# Patient Record
Sex: FEMALE | Race: WHITE | NOT HISPANIC OR LATINO | Employment: FULL TIME | URBAN - METROPOLITAN AREA
[De-identification: names, ages, dates, MRNs, and addresses within clinical notes are randomized per-mention and may not be internally consistent; named-entity substitution may affect disease eponyms.]

---

## 2023-06-06 ENCOUNTER — HOSPITAL ENCOUNTER (OUTPATIENT)
Facility: MEDICAL CENTER | Age: 23
End: 2023-06-06
Attending: OBSTETRICS & GYNECOLOGY
Payer: COMMERCIAL

## 2023-06-06 ENCOUNTER — GYNECOLOGY VISIT (OUTPATIENT)
Dept: OBGYN | Facility: CLINIC | Age: 23
End: 2023-06-06
Payer: COMMERCIAL

## 2023-06-06 VITALS
DIASTOLIC BLOOD PRESSURE: 84 MMHG | SYSTOLIC BLOOD PRESSURE: 113 MMHG | HEIGHT: 62 IN | WEIGHT: 141 LBS | BODY MASS INDEX: 25.95 KG/M2

## 2023-06-06 DIAGNOSIS — Z11.3 SCREEN FOR STD (SEXUALLY TRANSMITTED DISEASE): ICD-10-CM

## 2023-06-06 DIAGNOSIS — Z01.419 WOMEN'S ANNUAL ROUTINE GYNECOLOGICAL EXAMINATION: Primary | ICD-10-CM

## 2023-06-06 DIAGNOSIS — Z12.4 SCREENING FOR MALIGNANT NEOPLASM OF CERVIX: ICD-10-CM

## 2023-06-06 PROCEDURE — 87491 CHLMYD TRACH DNA AMP PROBE: CPT

## 2023-06-06 PROCEDURE — 99385 PREV VISIT NEW AGE 18-39: CPT | Performed by: OBSTETRICS & GYNECOLOGY

## 2023-06-06 PROCEDURE — 88175 CYTOPATH C/V AUTO FLUID REDO: CPT

## 2023-06-06 PROCEDURE — 3079F DIAST BP 80-89 MM HG: CPT | Performed by: OBSTETRICS & GYNECOLOGY

## 2023-06-06 PROCEDURE — 87591 N.GONORRHOEAE DNA AMP PROB: CPT

## 2023-06-06 PROCEDURE — 3074F SYST BP LT 130 MM HG: CPT | Performed by: OBSTETRICS & GYNECOLOGY

## 2023-06-06 NOTE — PROGRESS NOTES
ANNUAL GYNECOLOGY VISIT    Chief Complaint  Gynecologic Exam      Subjective  Génesis Ni is a 23 y.o. female who presents today for Annual Exam. She has no complaints but reports she has never had a pap smear. She has a Corin for contraception and it is due to be exchanged in July.    Preventive Care     There is no immunization history on file for this patient.  HPV vaccine: did not have    Gynecology History and ROS  Current Sexual Activity: yes   #lifetime partners: 1  History of sexually transmitted diseases?  no  Abnormal discharge? no    Menstrual History  No LMP recorded. Patient has had an implant.  Periods are absent secondary to Corin .   Clots or heavy flow: No  Intermenstrual bleeding/spotting: No  Significant pain with periods: No  Bothersome PMS symptoms: No  Significant Pelvic Pain: No    Contraception  IUD inserted ; Corin 2020    Pap History  Last pap: never    Cancer Risk Assessement:  Family history of:   - Breast cancer: paternal grandmother, 80s   - Ovarian cancer: no   - Uterine cancer: no   - Colon cancer: no    Obstetric History  OB History    Para Term  AB Living   0 0 0 0 0 0   SAB IAB Ectopic Molar Multiple Live Births   0 0 0 0 0 0       Past Medical History  Past Medical History:   Diagnosis Date    Asthma     Migraine        Past Surgical History  History reviewed. No pertinent surgical history.    Social History  Social History     Socioeconomic History    Marital status:      Spouse name: Not on file    Number of children: Not on file    Years of education: Not on file    Highest education level: Not on file   Occupational History    Not on file   Tobacco Use    Smoking status: Never    Smokeless tobacco: Never   Vaping Use    Vaping Use: Never used   Substance and Sexual Activity    Alcohol use: Not Currently    Drug use: Never    Sexual activity: Yes     Partners: Male     Birth control/protection: I.U.D.   Other Topics Concern    Not on file   Social  "History Narrative    Not on file     Social Determinants of Health     Financial Resource Strain: Not on file   Food Insecurity: Not on file   Transportation Needs: Not on file   Physical Activity: Not on file   Stress: Not on file   Social Connections: Not on file   Intimate Partner Violence: Not on file   Housing Stability: Not on file       Family History  Family History   Problem Relation Age of Onset    No Known Problems Mother     No Known Problems Father     Diabetes Maternal Grandmother     Breast Cancer Paternal Grandmother        Home Medications  No current outpatient medications on file prior to visit.     No current facility-administered medications on file prior to visit.       Allergies/Reactions  No Known Allergies    ROS  Positive ROS: none  Gen: no fevers or chills, no significant weight loss or gain, excessive fatigue  Respiratory:  no cough or dyspnea  Cardiac:  no chest pain, no palpitations, no syncope  Breast: no breast discharge, pain, lump or skin changes  GI:  no heartburn, no abdominal pain, no nausea or vomiting  Urinary: no dysuria, urgency, frequency, incontinence   Psych: no depression or anxiety  Neuro: no migraines with aura, fainting spells, numbness or tingling  Extremities: no joint pain, persistently swollen ankles, recurrent leg cramps      Physical Examination:  Vital Signs:   Vitals:    06/06/23 0813   BP: 113/84   Weight: 141 lb   Height: 5' 2\"     Body mass index is 25.79 kg/m².    Constitutional: The patient is well developed and well nourished.  Psychiatric: Patient is oriented to time place and person.   Skin: No rash observed.  Neck: Appears symmetric. Thyroid normal size  Respiratory: normal effort  Breast: Inspection reveals no asymetry or nipple discharge, no skin thickening, dimpling or erythema.  Palpation demonstrates no masses.  Abdomen: Soft, non-tender.  Pelvic Exam:     Vulva: external female genitalia are normal in appearance. No lesions    Urethra - no " lesions, no erythema    Vagina: moist, pink, normal ruggae    Cervix: pink, smooth, no lesions, no CMT; IUD string visible    Uterus - non-tender, normal size, shape, contour, mobile, anteverted    Ovaries: non-tender, no appreciable masses  Pap Smear and/or performed: yes  Extremeties: Legs are symmetric and without tenderness. There is no edema present.      Assessment & Plan  Génesis Ni is a 23 y.o. female who presents today for Annual Gyn Exam.     1. Women's annual routine gynecological examination  Breast and pelvic exam completed  Pap: done  Advised on breast self awareness  Advised on flu and/or covid vaccines in season  Advised on calcium and vit D intakr    2. Screening for malignant neoplasm of cervix    - THINPREP RFLX HPV ASCUS W/CTNG; Future    3. Screen for STD (sexually transmitted disease)    - THINPREP RFLX HPV ASCUS W/CTNG; Future      Return: for IUD remove and replacement and annually    Arlen Chapman D.O.

## 2023-06-07 LAB
C TRACH DNA GENITAL QL NAA+PROBE: NEGATIVE
CYTOLOGY REG CYTOL: NORMAL
N GONORRHOEA DNA GENITAL QL NAA+PROBE: NEGATIVE
SPECIMEN SOURCE: NORMAL

## 2023-07-25 ENCOUNTER — APPOINTMENT (OUTPATIENT)
Dept: OBGYN | Facility: CLINIC | Age: 23
End: 2023-07-25
Payer: COMMERCIAL

## 2023-07-28 ENCOUNTER — GYNECOLOGY VISIT (OUTPATIENT)
Dept: OBGYN | Facility: CLINIC | Age: 23
End: 2023-07-28
Payer: COMMERCIAL

## 2023-07-28 VITALS — BODY MASS INDEX: 25.97 KG/M2 | WEIGHT: 142 LBS | DIASTOLIC BLOOD PRESSURE: 86 MMHG | SYSTOLIC BLOOD PRESSURE: 119 MMHG

## 2023-07-28 DIAGNOSIS — Z30.433 ENCOUNTER FOR IUD REMOVAL AND REINSERTION: Primary | ICD-10-CM

## 2023-07-28 PROCEDURE — 58301 REMOVE INTRAUTERINE DEVICE: CPT | Performed by: OBSTETRICS & GYNECOLOGY

## 2023-07-28 PROCEDURE — 3074F SYST BP LT 130 MM HG: CPT | Performed by: OBSTETRICS & GYNECOLOGY

## 2023-07-28 PROCEDURE — 3079F DIAST BP 80-89 MM HG: CPT | Performed by: OBSTETRICS & GYNECOLOGY

## 2023-07-28 PROCEDURE — 58300 INSERT INTRAUTERINE DEVICE: CPT | Performed by: OBSTETRICS & GYNECOLOGY

## 2023-07-28 NOTE — PROGRESS NOTES
IUD Removal and Reinsertion Procedure Note    Reason for removal:  Sklya  The speculum was placed.  Betadine was applied to the cervix  The IUD strings were visible.  The strings were grasped and gentle traction was used to remove the device.  The procedure was completed successfully.  Type of IUD removed: Corin    Tenaculum clamp was then used used to grasp the cervix and provide counter traction.  The uterus was sounded with the IUD device to 6cm.   The device was withdrawn 1cm and the IUD was then deployed and gently advanced to the fundus without complications then the device removed.  Type of IUD placed: Mirena     Aftercare discussed. She is to return for fever, worsening pelvic pain, abdominal pain, syncope, unusually heavy vaginal bleeding, suspected expulsion, foul smelling vaginal discharge, or pregnancy-like symptoms.     If the patient is comfortable she may also perform a digital self examination to check for the strings or return to office in 1 mo for any concerns with IUD symptoms/placement/possible expulsion, otherwise no follow up needed.      Arlen Chapman D.O.  2023

## 2023-12-15 ENCOUNTER — OFFICE VISIT (OUTPATIENT)
Dept: URGENT CARE | Facility: PHYSICIAN GROUP | Age: 23
End: 2023-12-15
Payer: COMMERCIAL

## 2023-12-15 VITALS
DIASTOLIC BLOOD PRESSURE: 62 MMHG | TEMPERATURE: 98.6 F | SYSTOLIC BLOOD PRESSURE: 100 MMHG | OXYGEN SATURATION: 98 % | WEIGHT: 151 LBS | HEIGHT: 61 IN | RESPIRATION RATE: 16 BRPM | BODY MASS INDEX: 28.51 KG/M2 | HEART RATE: 88 BPM

## 2023-12-15 DIAGNOSIS — T78.40XA ALLERGIC REACTION, INITIAL ENCOUNTER: ICD-10-CM

## 2023-12-15 PROCEDURE — 3078F DIAST BP <80 MM HG: CPT | Performed by: NURSE PRACTITIONER

## 2023-12-15 PROCEDURE — 3074F SYST BP LT 130 MM HG: CPT | Performed by: NURSE PRACTITIONER

## 2023-12-15 PROCEDURE — 99203 OFFICE O/P NEW LOW 30 MIN: CPT | Performed by: NURSE PRACTITIONER

## 2023-12-15 RX ORDER — PREDNISONE 20 MG/1
20 TABLET ORAL DAILY
Qty: 7 TABLET | Refills: 0 | Status: SHIPPED | OUTPATIENT
Start: 2023-12-15 | End: 2023-12-22

## 2023-12-15 ASSESSMENT — ENCOUNTER SYMPTOMS
CHILLS: 0
SORE THROAT: 0
SHORTNESS OF BREATH: 0
FEVER: 0

## 2023-12-15 NOTE — PROGRESS NOTES
"Subjective:     Génesis Ni is a 23 y.o. female who presents for Allergic Reaction (X 3 days on face, neck and now all over body with mild chest tightness.)      Allergic Reaction  Associated symptoms include a rash. Pertinent negatives include no chills, fever or sore throat.     Pt presents for evaluation of a new problem.  Sascha is a very pleasant 23-year-old female presents to urgent care today with a suspected allergic reaction that started on her face 2 days ago.  Her rash has now spread to her neck and upper arms.  She denies any known mechanism including changes in soap, lotion, detergent, medication or food.  She does note that her rash both itches and hurts.  She has tried using over-the-counter hydrocortisone cream with no relief.      Review of Systems   Constitutional:  Negative for chills and fever.   HENT:  Negative for sore throat.    Respiratory:  Negative for shortness of breath.    Skin:  Positive for itching and rash.       PMH:   Past Medical History:   Diagnosis Date    Asthma     Migraine      ALLERGIES: No Known Allergies  SURGHX: No past surgical history on file.  SOCHX:   Social History     Socioeconomic History    Marital status:    Tobacco Use    Smoking status: Never    Smokeless tobacco: Never   Vaping Use    Vaping Use: Never used   Substance and Sexual Activity    Alcohol use: Not Currently    Drug use: Never    Sexual activity: Yes     Partners: Male     Birth control/protection: I.U.D.     FH:   Family History   Problem Relation Age of Onset    No Known Problems Mother     No Known Problems Father     Diabetes Maternal Grandmother     Breast Cancer Paternal Grandmother          Objective:   /62   Pulse 88   Temp 37 °C (98.6 °F) (Temporal)   Resp 16   Ht 1.549 m (5' 1\")   Wt 68.5 kg (151 lb)   SpO2 98%   BMI 28.53 kg/m²     Physical Exam  Vitals and nursing note reviewed.   Constitutional:       General: She is not in acute distress.     Appearance: Normal " appearance. She is normal weight. She is not ill-appearing or toxic-appearing.   HENT:      Head: Normocephalic.      Right Ear: External ear normal.      Left Ear: External ear normal.      Nose: No congestion or rhinorrhea.      Mouth/Throat:      Pharynx: No oropharyngeal exudate or posterior oropharyngeal erythema.   Eyes:      General:         Right eye: No discharge.         Left eye: No discharge.      Pupils: Pupils are equal, round, and reactive to light.   Pulmonary:      Effort: Pulmonary effort is normal.   Abdominal:      General: Abdomen is flat.   Musculoskeletal:         General: Normal range of motion.      Cervical back: Normal range of motion and neck supple.   Skin:     General: Skin is dry.      Comments: Macular erythemic rash present to bilateral face, neck and bilateral upper extremities.   Neurological:      General: No focal deficit present.      Mental Status: She is alert and oriented to person, place, and time. Mental status is at baseline.   Psychiatric:         Mood and Affect: Mood normal.         Behavior: Behavior normal.         Thought Content: Thought content normal.         Judgment: Judgment normal.         Assessment/Plan:   Assessment    1. Allergic reaction, initial encounter  predniSONE (DELTASONE) 20 MG Tab        Differential diagnoses discussed with patient.  She was prescribed 20 mg prednisone x 7 days for relief of her rash.  We did discuss use of oral antihistamine for further relief of itching.  Cool compresses recommended.  Patient to notify me if rash remains persistent and I will refer her to follow-up with allergy.  She is in agreement with plan of care.

## 2024-05-07 ENCOUNTER — OFFICE VISIT (OUTPATIENT)
Dept: URGENT CARE | Facility: PHYSICIAN GROUP | Age: 24
End: 2024-05-07
Payer: COMMERCIAL

## 2024-05-07 VITALS
HEART RATE: 74 BPM | RESPIRATION RATE: 14 BRPM | SYSTOLIC BLOOD PRESSURE: 124 MMHG | OXYGEN SATURATION: 97 % | HEIGHT: 62 IN | WEIGHT: 144 LBS | BODY MASS INDEX: 26.5 KG/M2 | DIASTOLIC BLOOD PRESSURE: 70 MMHG | TEMPERATURE: 98.8 F

## 2024-05-07 DIAGNOSIS — H10.33 ACUTE BACTERIAL CONJUNCTIVITIS OF BOTH EYES: ICD-10-CM

## 2024-05-07 DIAGNOSIS — J02.9 PHARYNGITIS, UNSPECIFIED ETIOLOGY: ICD-10-CM

## 2024-05-07 LAB — S PYO DNA SPEC NAA+PROBE: NOT DETECTED

## 2024-05-07 PROCEDURE — 3078F DIAST BP <80 MM HG: CPT | Performed by: NURSE PRACTITIONER

## 2024-05-07 PROCEDURE — 3074F SYST BP LT 130 MM HG: CPT | Performed by: NURSE PRACTITIONER

## 2024-05-07 PROCEDURE — 99213 OFFICE O/P EST LOW 20 MIN: CPT | Performed by: NURSE PRACTITIONER

## 2024-05-07 PROCEDURE — 87651 STREP A DNA AMP PROBE: CPT | Performed by: NURSE PRACTITIONER

## 2024-05-07 RX ORDER — POLYMYXIN B SULFATE AND TRIMETHOPRIM 1; 10000 MG/ML; [USP'U]/ML
1 SOLUTION OPHTHALMIC EVERY 4 HOURS
Qty: 10 ML | Refills: 0 | Status: SHIPPED | OUTPATIENT
Start: 2024-05-07 | End: 2024-05-14

## 2024-05-07 ASSESSMENT — ENCOUNTER SYMPTOMS
EYE DISCHARGE: 1
PHOTOPHOBIA: 0
RESPIRATORY NEGATIVE: 1
BLURRED VISION: 0
DOUBLE VISION: 0
FEVER: 0
SHORTNESS OF BREATH: 0
EYE REDNESS: 1
SORE THROAT: 1
CHILLS: 0
TROUBLE SWALLOWING: 1
COUGH: 0
CONSTITUTIONAL NEGATIVE: 1
MYALGIAS: 1

## 2024-05-07 ASSESSMENT — VISUAL ACUITY: OU: 1

## 2024-05-07 NOTE — PROGRESS NOTES
Subjective:     Génesis Ni is a 23 y.o. female who presents for Eye Problem (Bilateral draining and sore x 2-3 days ) and Pharyngitis (X 1 wk)       Eye Problem   This is a new problem. Episode onset: Started right eye yesterday, left eye today. The problem has been gradually worsening. There was no injury mechanism. Associated symptoms include an eye discharge and eye redness. Pertinent negatives include no blurred vision, double vision, fever or photophobia.   Pharyngitis   This is a new problem. Episode onset: 1 week ago. The problem has been gradually worsening. Associated symptoms include ear pain (Resolved) and trouble swallowing (Painful). Pertinent negatives include no congestion, coughing or shortness of breath. She has tried acetaminophen for the symptoms.     Review of Systems   Constitutional: Negative.  Negative for chills, fever and malaise/fatigue.   HENT:  Positive for ear pain (Resolved), sore throat and trouble swallowing (Painful). Negative for congestion.    Eyes:  Positive for discharge and redness. Negative for blurred vision, double vision and photophobia.   Respiratory: Negative.  Negative for cough and shortness of breath.    Musculoskeletal:  Positive for myalgias.   All other systems reviewed and are negative.    Refer to HPI for additional details.    During this visit, appropriate PPE was worn, and hand hygiene was performed.    PMH:  has a past medical history of Asthma and Migraine.    MEDS:   Current Outpatient Medications:     polymixin-trimethoprim (POLYTRIM) 99749-0.1 UNIT/ML-% Solution, Administer 1 Drop into both eyes every 4 hours for 7 days., Disp: 10 mL, Rfl: 0    ALLERGIES: No Known Allergies  SURGHX: History reviewed. No pertinent surgical history.  SOCHX:  reports that she has never smoked. She has never used smokeless tobacco. She reports that she does not currently use alcohol. She reports that she does not use drugs.    FH: Per HPI as  "applicable/pertinent.      Objective:     /70   Pulse 74   Temp 37.1 °C (98.8 °F) (Temporal)   Resp 14   Ht 1.575 m (5' 2\")   Wt 65.3 kg (144 lb)   SpO2 97%   BMI 26.34 kg/m²     Physical Exam  Nursing note reviewed.   Constitutional:       General: She is not in acute distress.     Appearance: She is well-developed. She is not ill-appearing or toxic-appearing.   HENT:      Head: No right periorbital erythema or left periorbital erythema.      Right Ear: Tympanic membrane normal.      Left Ear: Tympanic membrane normal.      Nose: Nose normal.      Right Sinus: No maxillary sinus tenderness or frontal sinus tenderness.      Left Sinus: No maxillary sinus tenderness or frontal sinus tenderness.      Mouth/Throat:      Mouth: Mucous membranes are moist.      Pharynx: Uvula midline. Pharyngeal swelling and posterior oropharyngeal erythema present. No oropharyngeal exudate.   Eyes:      General: Lids are normal. Vision grossly intact.         Right eye: No discharge.         Left eye: No discharge.      Extraocular Movements: Extraocular movements intact.      Conjunctiva/sclera:      Right eye: Right conjunctiva is injected.      Left eye: Left conjunctiva is injected.      Pupils: Pupils are equal, round, and reactive to light.   Neck:      Trachea: Phonation normal.   Cardiovascular:      Rate and Rhythm: Normal rate.   Pulmonary:      Effort: Pulmonary effort is normal. No respiratory distress.   Musculoskeletal:         General: No deformity. Normal range of motion.      Cervical back: Neck supple.   Skin:     General: Skin is warm and dry.      Coloration: Skin is not pale.   Neurological:      Mental Status: She is alert and oriented to person, place, and time.      Motor: No weakness.   Psychiatric:         Behavior: Behavior normal. Behavior is cooperative.     POCT Cepheid Group A Strep by PCR: negative      Assessment/Plan:     1. Acute bacterial conjunctivitis of both eyes  - " polymixin-trimethoprim (POLYTRIM) 99981-6.1 UNIT/ML-% Solution; Administer 1 Drop into both eyes every 4 hours for 7 days.  Dispense: 10 mL; Refill: 0    2. Pharyngitis, unspecified etiology  - POCT CEPHEID GROUP A STREP - PCR    Rx as above sent electronically.    Discussed likely self-limiting viral etiology for pharyngitis and expected course and duration of illness.     Emphasize supportive measures and symptom management with over-the-counter medication as needed.    Monitor. Return precautions advised.     Differential diagnosis, natural history, supportive care, over-the-counter symptom management per 's instructions, close monitoring, and indications for immediate follow-up discussed.     All questions answered. Patient agrees with the plan of care.    Discharge summary provided via [employer].

## 2024-05-07 NOTE — PATIENT INSTRUCTIONS
Advised of infectious nature of conjunctivitis. Isolate until on antibiotic drops for at least 24 hours. Avoid rubbing eyes. Perform frequent hand hygiene.

## 2024-09-26 ENCOUNTER — GYNECOLOGY VISIT (OUTPATIENT)
Dept: OBGYN | Facility: CLINIC | Age: 24
End: 2024-09-26
Payer: COMMERCIAL

## 2024-09-26 VITALS — SYSTOLIC BLOOD PRESSURE: 132 MMHG | BODY MASS INDEX: 26.16 KG/M2 | WEIGHT: 143 LBS | DIASTOLIC BLOOD PRESSURE: 76 MMHG

## 2024-09-26 DIAGNOSIS — Z30.432 ENCOUNTER FOR IUD REMOVAL: ICD-10-CM

## 2024-09-26 DIAGNOSIS — Z78.9 ATTEMPTING TO CONCEIVE: ICD-10-CM

## 2024-09-26 NOTE — PROGRESS NOTES
GYN FOLLOW UP VISIT    CC:  Procedure (IUD REMOVAL/)       HPI: Patient is a 24 y.o.  who presents for IUD removal. She had her IUD replaced 2023 with Dr. Chapman. She is getting this removed because her and her partner are trying to conceive.        ROS:   General: denies fever / chills  HEENT: denies sore throat:  CV: denies chest pain:  Repiratory: denies shortness of breath  GI: denies abdominal pain  : denies dysuria:    PFSH:  I personally reviewed the past medical and surgical histories.       PHYSICAL EXAMINATION:  Vital Signs:   Vitals:    24 1425   BP: 132/76   Weight: 143 lb     Body mass index is 26.16 kg/m².    Gen: appears well, NAD  Respiratory: normal effort  Abdomen: Soft, non-tender.  Pelvic Exam:    Vulva: normal.    Urethra: normal.   Vagina: normal.    Cervix: normal. IUD strings visualized at cervical OS   Uterus: normal size, shape and contour.    left adnexa: normal.   right adnexa: normal.   Perineum: normal.    ASSESSMENT AND PLAN:  24 y.o.        1. Encounter for IUD removal  See procedure note for IUD removal.   - Consent for all Surgical, Special Diagnostic or Therapeutic Procedures    2. Attempting to conceive  -Recommended that she start taking a prenatal vitamin and 400 micrograms of folic acid daily  - Discussed the importance of eating a very healthy diet to include fresh fruits and veggies, lean meats, whole grains, and drink plenty of water. Recommend regular exercise.   - Reviewed techniques to aid conception. Discussed methods and frequency for trying and ovulation testing. Read the directions on the box as they are all different.  - Avoid alcohol, tobacco, excessive caffeine, and drugs of any kind, unless needed and prescribed. Stay away from tanning beds, hot tubs and saunas.   - Avoid harmful chemicals, environmental contaminants, and other toxic substances such as synthetic chemicals, some metals, fertilizer, bug spray, and cat or rodent feces around  the home and in the workplace.         Time spent: 30 minutes      Ashely Moreland P.A.-C.

## 2024-09-26 NOTE — PROCEDURES
IUD Removal Procedure Note    Reason for removal: trying to conceive  The speculum was placed.  The IUD strings were visible.  The strings were grasped and gentle traction was used to remove the device.  The procedure was completed successfully.  Type of IUD removed: Corin Moreland P.A.-C.  9/26/2024

## 2024-12-12 ENCOUNTER — TELEPHONE (OUTPATIENT)
Dept: OBGYN | Facility: CLINIC | Age: 24
End: 2024-12-12
Payer: COMMERCIAL

## 2024-12-12 NOTE — TELEPHONE ENCOUNTER
Left message informing patient that appt had to be moved in time. The provider will be in a meeting at the time she was scheduled. MD@2:17PM   Medication/Dose: percocet  Patient last seen by PCP: 2/26/2021  Next office visit with PCP: none  Last Lab:9/13/19    Above information requires contacting patient: No    PDMP needs to be reviewed by Provider    Sent to provider to approve or deny

## 2025-01-15 ENCOUNTER — HOSPITAL ENCOUNTER (OUTPATIENT)
Facility: MEDICAL CENTER | Age: 25
End: 2025-01-15
Attending: STUDENT IN AN ORGANIZED HEALTH CARE EDUCATION/TRAINING PROGRAM
Payer: COMMERCIAL

## 2025-01-15 ENCOUNTER — APPOINTMENT (OUTPATIENT)
Dept: OBGYN | Facility: CLINIC | Age: 25
End: 2025-01-15
Payer: COMMERCIAL

## 2025-01-15 VITALS — BODY MASS INDEX: 24.69 KG/M2 | DIASTOLIC BLOOD PRESSURE: 72 MMHG | WEIGHT: 135 LBS | SYSTOLIC BLOOD PRESSURE: 121 MMHG

## 2025-01-15 DIAGNOSIS — Z32.01 PREGNANCY TEST POSITIVE: ICD-10-CM

## 2025-01-15 DIAGNOSIS — Z34.81 ENCOUNTER FOR SUPERVISION OF OTHER NORMAL PREGNANCY, FIRST TRIMESTER: ICD-10-CM

## 2025-01-15 DIAGNOSIS — Z86.59 HISTORY OF DEPRESSION: ICD-10-CM

## 2025-01-15 DIAGNOSIS — Z34.01 ENCOUNTER FOR SUPERVISION OF NORMAL FIRST PREGNANCY IN FIRST TRIMESTER: ICD-10-CM

## 2025-01-15 LAB
POCT INT CON NEG: NEGATIVE
POCT INT CON POS: POSITIVE
POCT URINE PREGNANCY TEST: POSITIVE

## 2025-01-15 PROCEDURE — 0501F PRENATAL FLOW SHEET: CPT | Performed by: STUDENT IN AN ORGANIZED HEALTH CARE EDUCATION/TRAINING PROGRAM

## 2025-01-15 PROCEDURE — 3074F SYST BP LT 130 MM HG: CPT | Performed by: STUDENT IN AN ORGANIZED HEALTH CARE EDUCATION/TRAINING PROGRAM

## 2025-01-15 PROCEDURE — 3078F DIAST BP <80 MM HG: CPT | Performed by: STUDENT IN AN ORGANIZED HEALTH CARE EDUCATION/TRAINING PROGRAM

## 2025-01-15 PROCEDURE — 87591 N.GONORRHOEAE DNA AMP PROB: CPT

## 2025-01-15 PROCEDURE — 81025 URINE PREGNANCY TEST: CPT | Performed by: STUDENT IN AN ORGANIZED HEALTH CARE EDUCATION/TRAINING PROGRAM

## 2025-01-15 PROCEDURE — 87491 CHLMYD TRACH DNA AMP PROBE: CPT

## 2025-01-15 ASSESSMENT — EDINBURGH POSTNATAL DEPRESSION SCALE (EPDS)
I HAVE FELT SCARED OR PANICKY FOR NO GOOD REASON: NO, NOT MUCH
I HAVE BEEN ABLE TO LAUGH AND SEE THE FUNNY SIDE OF THINGS: AS MUCH AS I ALWAYS COULD
THINGS HAVE BEEN GETTING ON TOP OF ME: YES, SOMETIMES I HAVEN'T BEEN COPING AS WELL AS USUAL
THE THOUGHT OF HARMING MYSELF HAS OCCURRED TO ME: HARDLY EVER
I HAVE BEEN ANXIOUS OR WORRIED FOR NO GOOD REASON: YES, SOMETIMES
TOTAL SCORE: 10
I HAVE FELT SAD OR MISERABLE: NOT VERY OFTEN
I HAVE BEEN SO UNHAPPY THAT I HAVE BEEN CRYING: ONLY OCCASIONALLY
I HAVE BLAMED MYSELF UNNECESSARILY WHEN THINGS WENT WRONG: NOT VERY OFTEN
I HAVE BEEN SO UNHAPPY THAT I HAVE HAD DIFFICULTY SLEEPING: NOT VERY OFTEN
I HAVE LOOKED FORWARD WITH ENJOYMENT TO THINGS: AS MUCH AS I EVER DID

## 2025-01-15 NOTE — ASSESSMENT & PLAN NOTE
Hx of depression between [id number] and was on wellbutrin. Currently is not on anything and does not see a therapist routinely.   EPDS: 10  Denies SI or HI. Currently not interested in medication. Resources for local therapists given to patient.

## 2025-01-15 NOTE — ASSESSMENT & PLAN NOTE
24 y.o.  dated by LMP  - Final LORETTA: 2025    Screening:  [ ] ASA indicated (< 16 wks): pk yes no ASA: No  [ ] Early DM screening indicated? - No  [ ]  depression (EPDS score(s)): 10  [ ] H/o genital HSV? no  [ ] Varicella immunity: denies hx of chicken pox    Labs and routine testing  [ ] 1T labs: **  [ ] ABO/RH: **, Ab screen neg**  [ ] Pap: 2023- NILM  [ ] Genetic screening: Panorama NIPT  [ ] AFP (15-20 wks): **  [ ] GDM (24-28 wks) screening   [ ] 1 hr GTT: **    [ ] 3hr GTT: **  [ ] 3T labs (28+ wks): Hgb: **/ plts **/HIV **/syphilis **  [ ] GBS (35 wks) **      Immunizations:   [ ] tdap (27+ wks) -   [ ] flu-    [ ] RSV (32-36 wks)-   [ ] Rhogam -     Ultrasound(s):   [ ] Anatomy US: **    Delivery Plan:   [ ] Planned mode of delivery:   [ ] BTL consent:      [ ] Fetal presentation (35-36 w):   [ ] Timing of delivery:   [ ] Accepting of transfusion: Yes    Postpartum:  [ ] PPBCM: **     [ ] considering BTL? (Med 178 signed): **  [ ] PP Vaccinations needed: **  [ ] Breat Pump       Orders:    CBC WITHOUT DIFFERENTIAL; Future    Chlamydia/GC, PCR (Urine); Future    HEMOGLOBIN A1C; Future    HEP B CORE AB TOTAL; Future    HEP B SURFACE AB; Future    HEP B SURFACE ANTIGEN; Future    HEP C VIRUS ANTIBODY; Future    HIV AG/AB COMBO ASSAY SCREENING; Future    OP Prenatal Panel-Blood Bank; Future    RUBELLA ABS IGG; Future    T.PALLIDUM AB LEFTY (SCREENING); Future    URINE CULTURE(NEW); Future    URINE DRUG SCREEN; Future    VARICELLA ZOSTER IGG AB; Future    US-OB 1ST TRIMESTER WITH TRANSVAGINAL (COMBO); Future

## 2025-01-15 NOTE — PROGRESS NOTES
Establish Pregnancy Visit    CC: First OB Visit    HPI: Patient is a 24 y.o.  at 10w4d who presents for her first OB visit.    Patient's last menstrual period was 2024. giving her an Estimated Date of Delivery: 25.  Had IUD and was removed in September. Had two menstrual cycles since having IUD removed.  She is NOT feeling fetal movement.  She denies vaginal bleeding, reports nausea, denies vomiting. She is currently not taking anything for nausea. She denies headaches, vaginal discharge, or urinary symptoms.      Pt reports insomnia. Has been taking Unisom 25 mg nightly. Also, c/o heartbun. States is improving. Has not tried anything for this.  ER visits or previous care in this pregnancy: DENIES.     FOB is PATRICIA   Pregnancy is planned, desired.    She is currently working [employer].    Pre-eclampsia Risk Factors:   None     Gestational Diabetes Risk Factors:   None      GYN HX:   Last Pap: 2023 NILM  Hx Moderate or Severe Dysplasia : no  Hx STD : no    OBSTETRIC HISTORY:  OB History    Para Term  AB Living   1 0 0 0 0 0   SAB IAB Ectopic Molar Multiple Live Births   0 0 0 0 0 0      # Outcome Date GA Lbr Addison/2nd Weight Sex Type Anes PTL Lv   1 Current                Prior pregnancy complications: n/a .   Prior caesarian deliveries: n/a.  No history of preeclampsia, gestational diabetes, macrosomia, fetal growth restriction or low birthweight baby, or genetic abnormalities.   No history of  deliveries, stillborn, or multiples.     MEDICAL HISTORY:  Past Medical History:   Diagnosis Date    Asthma     Migraine        Significant medical history: Denies .   Denies history of asthma, HSV, PCOS, diabetes or glucose impairment, renal disease, hypertension, hyperlipidemia, autoimmune diseases (lupus or antiphospholipid antibody syndrome), sleep apnea. No history of transfusions.     Psych History   Depression/anxiety:  Hx of depression. Dx'd [id number]. Was on  wellbutrin and felt this helped during her pregnancy.Does not see regular therapist.   Bipolar    Denies  Postpartum Mood Changes N/a    MEDICATIONS:  Current Outpatient Medications on File Prior to Visit   Medication Sig Dispense Refill    Prenatal MV-Min-Fe Fum-FA-DHA (PRENATAL 1 PO) Take  by mouth.      Doxylamine Succinate, Sleep, (UNISOM PO) Take  by mouth.       No current facility-administered medications on file prior to visit.       Current Medications:   Patient is taking prenatal vitamin.  Significant medications: Ricki.     FAMILY HISTORY:  Family History   Problem Relation Age of Onset    No Known Problems Mother     No Known Problems Father     Diabetes Maternal Grandmother     Breast Cancer Paternal Grandmother        Denies Family history of genetic disorders (cystic fibrosis, SMA, Fragile X, Ti-sachs), hemoglobinopathies (thalassemia, Sickle cell), preeclampsia, birth defects, developmental delays, diabetes, cardiovascular disease, cancers.     SURGICAL HISTORY:  History reviewed. No pertinent surgical history.    Denies history of pelvic or bariatric surgery     ALLERGIES / REACTIONS:  No Known Allergies             SOCIAL HISTORY:   reports that she has never smoked. She has never used smokeless tobacco. She reports that she does not currently use alcohol. She reports that she does not use drugs.    Denies any current or hx of sexual, emotional or physical abuse or trauma.     ROS:   Gen: no fevers or chills, no significant weight loss or gain, excessive fatigue  Respiratory:  no cough or dyspnea  Cardiac:  no chest pain, no palpitations, no syncope  Breast: no breast discharge, pain, lump or skin changes  GI:  no heartburn, no abdominal pain, no nausea or vomiting  Urinary: no dysuria, urgency, frequency, incontinence   Psych: no depression or anxiety  Neuro: no migraines with aura, fainting spells, numbness or tingling  Extremities: no joint pain, persistently swollen ankles, recurrent leg  "cramps         PHYSICAL EXAMINATION:  Vital Signs: /72   Wt 135 lb   LMP 11/02/2024 Comment: irregular with iud  BMI 24.69 kg/m²    Constitutional: The patient is well developed and well nourished.  Psychiatric: Patient is oriented to time place and person.   Skin: No rash observed.  Neck: Neck appears symmetric. There are no masses or adenopathy present.  Respiratory: normal effort  Abdomen: Soft, non-tender.  Pelvic: declined   Pap Smear Obtained: yes and no  Extremeties: Legs are symmetric and without tenderness. There is no edema present.    EPDS Score: 10- she answered \"hardly ever\" on number 10 on EPDS    ACOG SCREENING  Infection Prevention  1. High Risk For HIV: No 6. Rash Or Illness Since LMP: No     2. High Risk For Hepatitis B or C: No 7. History Of STD, GC, Chlamydia, HPV Syphilis: No     3. Live With Someone With TB Or Exposed To TB: No 8. Have a cat in the home?: No     4. Patient Or Partner Has A History Of Herpes: No      5. History of Chicken Pox: No 9. Other (See Comments Below): No            Genetic Screening/Teratology Counseling- Includes patient, baby's father, or anyone in either family with:  Patient's age 35 years or older as of estimated date of delivery: No     Thalassemia (Italian, Greek, Mediterranean, or  background): MCV less than 80: No     Neural tube defect (Meningomyelocele, Spina bifida, or Anencephaly): No     Congenital heart defect: No     Down syndrome: No     Ti-Sachs (Ashkenazi Mu-ism, Cajun, Nepalese [place]): No     Canavan disease (Ashkenazi Mu-ism): No     Familial dysautonomia (Ashkenazi Mu-ism): No     Sickle cell disease or trait (): No     Hemophilia or other blood disorders: No     Muscular dystrophy: No    Cystic fibrosis: No     Armida's chorea: No     Mental retardation/autism: No     Other inherited genetic or chromosomal disorder: No     Maternal metabolic disorder (eg. Type 1 diabetes, PKU): Yes (Comment: MOB maternal grandma) "     Patient or baby's father had child with birth defects not listed above: No     Recurrent pregnancy loss, or a stillbirth: No     Medications (including supplements, vitamins, herbs, or OTC drugs)/illicit/recreational drugs/alcohol since last menstrual period: No                 ASSESSMENT AND PLAN:  24 y.o.  at 10w4d     Pregnancy Problems (from 01/15/25 to present)       No problems associated with this episode.          Assessment & Plan  Encounter for supervision of normal first pregnancy in first trimester    24 y.o.  dated by LMP  - Final LORETTA: 2025    Screening:  [ ] ASA indicated (< 16 wks): pk yes no ASA: No  [ ] Early DM screening indicated? - No  [ ]  depression (EPDS score(s)): 10  [ ] H/o genital HSV? no  [ ] Varicella immunity: denies hx of chicken pox    Labs and routine testing  [ ] 1T labs: **  [ ] ABO/RH: **, Ab screen neg**  [ ] Pap: 2023- NILM  [ ] Genetic screening: Panorama NIPT  [ ] AFP (15-20 wks): **  [ ] GDM (24-28 wks) screening   [ ] 1 hr GTT: **    [ ] 3hr GTT: **  [ ] 3T labs (28+ wks): Hgb: **/ plts **/HIV **/syphilis **  [ ] GBS (35 wks) **      Immunizations:   [ ] tdap (27+ wks) -   [ ] flu-    [ ] RSV (32-36 wks)-   [ ] Rhogam -     Ultrasound(s):   [ ] Anatomy US: **    Delivery Plan:   [ ] Planned mode of delivery:   [ ] BTL consent:      [ ] Fetal presentation (35-36 w):   [ ] Timing of delivery:   [ ] Accepting of transfusion: Yes    Postpartum:  [ ] PPBCM: **     [ ] considering BTL? (Med 178 signed): **  [ ] PP Vaccinations needed: **  [ ] Breat Pump       Orders:    CBC WITHOUT DIFFERENTIAL; Future    Chlamydia/GC, PCR (Urine); Future    HEMOGLOBIN A1C; Future    HEP B CORE AB TOTAL; Future    HEP B SURFACE AB; Future    HEP B SURFACE ANTIGEN; Future    HEP C VIRUS ANTIBODY; Future    HIV AG/AB COMBO ASSAY SCREENING; Future    OP Prenatal Panel-Blood Bank; Future    RUBELLA ABS IGG; Future    T.PALLIDUM AB LEFTY (SCREENING); Future    URINE  CULTURE(NEW); Future    URINE DRUG SCREEN; Future    VARICELLA ZOSTER IGG AB; Future    US-OB 1ST TRIMESTER WITH TRANSVAGINAL (COMBO); Future    Encounter for supervision of other normal pregnancy, first trimester    Orders:    MICHAEL PRENATAL TEST    History of depression  Hx of depression between 4773-9479 and was on wellbutrin. Currently is not on anything and does not see a therapist routinely.   EPDS: 10  Denies SI or HI. Currently not interested in medication. Resources for local therapists given to patient.                  - Orders  - Pap UTD, STI testing, Vag path: declined  - Routine Labs ordered:  PNL, Hepatitis Triple screening, UDS  - Discussed options for genetic/aneuploidy testing and information given for pt to consider.  Advised to call insurance for cost of testing.             -  Ordered Aneuploidy testing (NIPT)  - Dating pregnancy US: ordered   - Pregnancy risk factor counseling   - Start ASA after 12 weeks: not indicated   - Early 1 hour GTT ordered: not indicated   - Baseline PIH labs: not indicated   - Hemoglobin electrophoresis ordered: not indicated   - BMI: <24.9 Normal range   - Planned delivery method: vaginal  - MFM referral:  NO  - Discussed recommendation and timing for flu, Covid, RSV, TDaP vaccine during pregnancy.   - Discussed office policies, group practice model, and prenatal care timeline.  - Discussed PNV (pills), healthy nutrition, adequate water intake, weight gain, and activity level in pregnancy. Encouraged moderate exercise may continue into final trimester. Discussed pregnancy specific modifications. Educational booklet given.         Return in 2-3 weeks for New OB consult with physician       Ashely Moreland P.A.-C.  [facility]

## 2025-01-15 NOTE — PROGRESS NOTES
NOB today   First prenatal care appt  Pt. States nausea, insomnia, and heart burn   Pharmacy verified  Phone # verified   EPDS 10  FOAURORA Estes , employed full time involved in pregnancy  Pt employed full time [employer]   NIPT yes debating on adding gender  Last PAP: 06/06/2023 wnl  Flu vaccine: declined

## 2025-01-16 LAB
C TRACH DNA GENITAL QL NAA+PROBE: NEGATIVE
N GONORRHOEA DNA GENITAL QL NAA+PROBE: NEGATIVE
SPECIMEN SOURCE: NORMAL

## 2025-01-21 ENCOUNTER — HOSPITAL ENCOUNTER (OUTPATIENT)
Dept: RADIOLOGY | Facility: MEDICAL CENTER | Age: 25
End: 2025-01-21
Attending: STUDENT IN AN ORGANIZED HEALTH CARE EDUCATION/TRAINING PROGRAM
Payer: COMMERCIAL

## 2025-01-21 ENCOUNTER — HOSPITAL ENCOUNTER (OUTPATIENT)
Dept: LAB | Facility: MEDICAL CENTER | Age: 25
End: 2025-01-21
Attending: STUDENT IN AN ORGANIZED HEALTH CARE EDUCATION/TRAINING PROGRAM
Payer: COMMERCIAL

## 2025-01-21 DIAGNOSIS — Z34.01 ENCOUNTER FOR SUPERVISION OF NORMAL FIRST PREGNANCY IN FIRST TRIMESTER: ICD-10-CM

## 2025-01-21 LAB
ABO GROUP BLD: NORMAL
BLD GP AB SCN SERPL QL: NORMAL
ERYTHROCYTE [DISTWIDTH] IN BLOOD BY AUTOMATED COUNT: 44.7 FL (ref 35.9–50)
EST. AVERAGE GLUCOSE BLD GHB EST-MCNC: 105 MG/DL
HBA1C MFR BLD: 5.3 % (ref 4–5.6)
HBV CORE AB SERPL QL IA: NONREACTIVE
HBV SURFACE AB SERPL IA-ACNC: <3.5 MIU/ML (ref 0–10)
HBV SURFACE AG SER QL: NORMAL
HCT VFR BLD AUTO: 43.4 % (ref 37–47)
HCV AB SER QL: NORMAL
HGB BLD-MCNC: 14.4 G/DL (ref 12–16)
HIV 1+2 AB+HIV1 P24 AG SERPL QL IA: NORMAL
MCH RBC QN AUTO: 29.9 PG (ref 27–33)
MCHC RBC AUTO-ENTMCNC: 33.2 G/DL (ref 32.2–35.5)
MCV RBC AUTO: 90 FL (ref 81.4–97.8)
PLATELET # BLD AUTO: 258 K/UL (ref 164–446)
PMV BLD AUTO: 11.2 FL (ref 9–12.9)
RBC # BLD AUTO: 4.82 M/UL (ref 4.2–5.4)
RH BLD: NORMAL
RUBV AB SER QL: 103 IU/ML
T PALLIDUM AB SER QL IA: NORMAL
WBC # BLD AUTO: 11.6 K/UL (ref 4.8–10.8)

## 2025-01-21 PROCEDURE — 76817 TRANSVAGINAL US OBSTETRIC: CPT

## 2025-01-21 PROCEDURE — 87389 HIV-1 AG W/HIV-1&-2 AB AG IA: CPT

## 2025-01-21 PROCEDURE — 86706 HEP B SURFACE ANTIBODY: CPT

## 2025-01-21 PROCEDURE — 86900 BLOOD TYPING SEROLOGIC ABO: CPT

## 2025-01-21 PROCEDURE — 86704 HEP B CORE ANTIBODY TOTAL: CPT

## 2025-01-21 PROCEDURE — 85027 COMPLETE CBC AUTOMATED: CPT

## 2025-01-21 PROCEDURE — 87340 HEPATITIS B SURFACE AG IA: CPT

## 2025-01-21 PROCEDURE — 80307 DRUG TEST PRSMV CHEM ANLYZR: CPT

## 2025-01-21 PROCEDURE — 83036 HEMOGLOBIN GLYCOSYLATED A1C: CPT

## 2025-01-21 PROCEDURE — 86803 HEPATITIS C AB TEST: CPT

## 2025-01-21 PROCEDURE — 87086 URINE CULTURE/COLONY COUNT: CPT

## 2025-01-21 PROCEDURE — 86787 VARICELLA-ZOSTER ANTIBODY: CPT

## 2025-01-21 PROCEDURE — 36415 COLL VENOUS BLD VENIPUNCTURE: CPT

## 2025-01-21 PROCEDURE — 86780 TREPONEMA PALLIDUM: CPT

## 2025-01-21 PROCEDURE — 86901 BLOOD TYPING SEROLOGIC RH(D): CPT

## 2025-01-21 PROCEDURE — 86762 RUBELLA ANTIBODY: CPT

## 2025-01-21 PROCEDURE — 86850 RBC ANTIBODY SCREEN: CPT

## 2025-01-23 LAB
AMPHET CTO UR CFM-MCNC: NEGATIVE NG/ML
BACTERIA UR CULT: NORMAL
BARBITURATES CTO UR CFM-MCNC: NEGATIVE NG/ML
BENZODIAZ CTO UR CFM-MCNC: NEGATIVE NG/ML
CANNABINOIDS CTO UR CFM-MCNC: NEGATIVE NG/ML
COCAINE CTO UR CFM-MCNC: NEGATIVE NG/ML
CREAT UR-MCNC: <20 MG/DL (ref 20–400)
DRUG COMMENT 753798: NORMAL
METHADONE CTO UR CFM-MCNC: NEGATIVE NG/ML
OPIATES CTO UR CFM-MCNC: NEGATIVE NG/ML
PCP CTO UR CFM-MCNC: NEGATIVE NG/ML
PROPOXYPH CTO UR CFM-MCNC: NEGATIVE NG/ML
SIGNIFICANT IND 70042: NORMAL
SITE SITE: NORMAL
SOURCE SOURCE: NORMAL
VZV IGG SER IA-ACNC: 1 S/CO

## 2025-01-27 ENCOUNTER — INITIAL PRENATAL (OUTPATIENT)
Dept: OBGYN | Facility: CLINIC | Age: 25
End: 2025-01-27
Payer: COMMERCIAL

## 2025-01-27 VITALS — DIASTOLIC BLOOD PRESSURE: 72 MMHG | BODY MASS INDEX: 24.33 KG/M2 | SYSTOLIC BLOOD PRESSURE: 111 MMHG | WEIGHT: 133 LBS

## 2025-01-27 DIAGNOSIS — Z34.01 ENCOUNTER FOR SUPERVISION OF NORMAL FIRST PREGNANCY IN FIRST TRIMESTER: ICD-10-CM

## 2025-01-27 PROCEDURE — 0502F SUBSEQUENT PRENATAL CARE: CPT | Performed by: OBSTETRICS & GYNECOLOGY

## 2025-01-27 PROCEDURE — 3074F SYST BP LT 130 MM HG: CPT | Performed by: OBSTETRICS & GYNECOLOGY

## 2025-01-27 PROCEDURE — 3078F DIAST BP <80 MM HG: CPT | Performed by: OBSTETRICS & GYNECOLOGY

## 2025-01-27 NOTE — PROGRESS NOTES
Initial physician OB Visit Note - 12w2d     HPI:  Génesis Ni is a 24 y.o. female  at 12w2d who presents for return OB visit.  The patient presents with no significant medical complications.  Her dating ultrasound was consistent with her last menstrual period with an estimated of confinement of 2025.  The patient denies vaginal bleeding, cramping or vaginal discharge.    Past medical history-depression , off medication since     Past GYN history-see HPI  Pap smears current without history of dysplasia    Exam:  General-well-developed well-nourished in no acute distress  HEENT-within normal limits  Lungs-good auscultation  Heart-regular rate rhythm  Abdomen-soft, nondistended, nontender  Fetal heart tones-150 bpm  Pelvic-deferred secondary to recent exam      Assessment/plan:  Today's visit addressed:   1.  Course of prenatal care  2.  Labs were reviewed, the patient reported that at an declined covering NIPT    Pregnancy complicated by:  Patient Active Problem List   Diagnosis    Encounter for supervision of normal first pregnancy in first trimester    History of depression       The patient will follow up in 4 week(s). She was counseled to call or return for vaginal bleeding, regular contractions, leakage of fluid or decreased fetal movement.  I inquire about NIPT coverage.  She does have a follow-up appointment in 4 weeks and a anatomy scan for 20 weeks.  Questions were answered to her satisfaction.    Igor Willoughby M.D.

## 2025-02-21 NOTE — PROGRESS NOTES
Pt here for OB follow-up  Last seen on: 1/27/25 Case DESAI, LOF, UC  Phone: [phone]   Pharmacy verified   Rh type: O+  Pt states she is having some cramping. She has the sensation to urinate very frequently, she has no pain with this and does not believe it is a UTI.     Genetic testing results: not yet completed       Labs:  Completed, wnl    US:  Completed, wnl   ^ probable 1.79cm corpus luteum cyst on the left ovary.     Vaccines:  FLU: declined

## 2025-02-24 ENCOUNTER — ROUTINE PRENATAL (OUTPATIENT)
Dept: OBGYN | Facility: CLINIC | Age: 25
End: 2025-02-24
Payer: COMMERCIAL

## 2025-02-24 VITALS — SYSTOLIC BLOOD PRESSURE: 111 MMHG | WEIGHT: 132 LBS | DIASTOLIC BLOOD PRESSURE: 65 MMHG | BODY MASS INDEX: 24.14 KG/M2

## 2025-02-24 DIAGNOSIS — Z34.02 SUPERVISION OF NORMAL FIRST PREGNANCY IN SECOND TRIMESTER: ICD-10-CM

## 2025-02-24 PROCEDURE — 3074F SYST BP LT 130 MM HG: CPT | Performed by: STUDENT IN AN ORGANIZED HEALTH CARE EDUCATION/TRAINING PROGRAM

## 2025-02-24 PROCEDURE — 0502F SUBSEQUENT PRENATAL CARE: CPT | Performed by: STUDENT IN AN ORGANIZED HEALTH CARE EDUCATION/TRAINING PROGRAM

## 2025-02-24 PROCEDURE — 3078F DIAST BP <80 MM HG: CPT | Performed by: STUDENT IN AN ORGANIZED HEALTH CARE EDUCATION/TRAINING PROGRAM

## 2025-02-24 NOTE — PROGRESS NOTES
S: 24 y.o.  at 16w2d presents for routine obstetric follow-up. Not yet sensing fetal movement. No frequent contractions, vaginal bleeding, leakage of fluid, or dysuria.She has a history of intermittent headaches prior to pregnancy, more frequent ever since becoming pregnant. Resolves on their own/with rest and hydration. Denies headaches with vision changes, abd pain, swelling of hands/face.     Patient reports of occasional cramping and overall an increased sensation to urinate. Denies dysuria, pelvic/abd pain, flank pain. She inquires if this is related to common pregnancy symptoms. Declines UA/urine culture.     Nausea has improved, food aversions have resolved.      O: /65   Wt 132 lb   LMP 2024 Comment: irregular with iud  BMI 24.14 kg/m²   Patients' weight gain, fluid intake and exercise level discussed.  Vitals, fundal height , fetal position, and FHR reviewed on flowsheet    Patient Active Problem List    Diagnosis Date Noted    Encounter for supervision of normal first pregnancy in first trimester 01/15/2025    History of depression 01/15/2025        Lab:No results found for this or any previous visit (from the past 2 weeks).  Ultrasounds:  - Dating US done 25:SIUP. CRL 4.20 cm, EGA 11w0d with LORETTA 25. Probably 1.79 cm corpus luteum cyst on left ovary.   - 20 wk US: Ordered on 25. Patient has not yet scheduled, plans to get this done by 20wks.   TDaP: To be done after 27wks  Flu: Declines, education provided  RSV: Educated on RSV vaccine at 32-36w  GBS: To be done at 36 wks.  Rh: positive (O positive)  1 hr GTT: To be done with midtrimester labs  Genetic testing: Desires Panorama but is waiting for insurance to cover.  STI testing: negative     A/P:  24 y.o.  at 16w2d presents for routine obstetric follow-up.  Size equals dates and/or scan  Assessment & Plan  Supervision of normal first pregnancy in second trimester  - S/sx pregnancy, pre E, and labor warning signs  vs general discomforts discussed  - Discussed with patient that she may start sensing slight fetal movements. Kick counts to be done later in pregnancy at 28 wks.   - Adequate hydration reinforced. Aim for drinking 8-12 cups (64-96 ounces) of water daily.   - Nutrition/exercise/vitamin education; continue PNV  - Encouraged tour of LnD/childbirth education classes: contact info provided   - Anticipatory guidance given  - Reviewed prenatal labs.  - Recommend patient schedules her 20 wk anatomy US       Follow-up in 4 weeks.    MANN Bowie.[email or web address].  [facility]

## 2025-03-24 ENCOUNTER — ROUTINE PRENATAL (OUTPATIENT)
Dept: OBGYN | Facility: CLINIC | Age: 25
End: 2025-03-24
Payer: COMMERCIAL

## 2025-03-24 ENCOUNTER — HOSPITAL ENCOUNTER (OUTPATIENT)
Facility: MEDICAL CENTER | Age: 25
End: 2025-03-24
Payer: COMMERCIAL

## 2025-03-24 VITALS — SYSTOLIC BLOOD PRESSURE: 123 MMHG | WEIGHT: 135.2 LBS | DIASTOLIC BLOOD PRESSURE: 83 MMHG | BODY MASS INDEX: 24.73 KG/M2

## 2025-03-24 DIAGNOSIS — F32.A DEPRESSION AFFECTING PREGNANCY IN SECOND TRIMESTER, ANTEPARTUM: ICD-10-CM

## 2025-03-24 DIAGNOSIS — R10.2 SUPRAPUBIC ABDOMINAL PAIN: ICD-10-CM

## 2025-03-24 DIAGNOSIS — O99.342 DEPRESSION AFFECTING PREGNANCY IN SECOND TRIMESTER, ANTEPARTUM: ICD-10-CM

## 2025-03-24 PROCEDURE — 87086 URINE CULTURE/COLONY COUNT: CPT

## 2025-03-24 PROCEDURE — 3074F SYST BP LT 130 MM HG: CPT

## 2025-03-24 PROCEDURE — 3079F DIAST BP 80-89 MM HG: CPT

## 2025-03-24 PROCEDURE — 0502F SUBSEQUENT PRENATAL CARE: CPT

## 2025-03-24 RX ORDER — BUPROPION HYDROCHLORIDE 150 MG/1
150 TABLET ORAL EVERY MORNING
Qty: 60 TABLET | Refills: 3 | Status: SHIPPED | OUTPATIENT
Start: 2025-03-24

## 2025-03-24 RX ORDER — BUPROPION HYDROCHLORIDE 150 MG/1
150 TABLET, EXTENDED RELEASE ORAL 2 TIMES DAILY
Qty: 60 TABLET | Refills: 3 | Status: SHIPPED | OUTPATIENT
Start: 2025-03-24 | End: 2025-03-24 | Stop reason: CLARIF

## 2025-03-24 NOTE — PROGRESS NOTES
S: 24 y.o.  at 20w2d presents for routine obstetric follow-up.   Good fetal movement.  No contractions, vaginal bleeding, or leakage of fluid.    Has noticed increased number and severity of headaches since last visit. Has had episodes where she cannot drive because she is in so much pain. Had migraines consistently prior to pregnancy surrounding menses. APAP, band around head, rest, and drinks approximately 64-90 ounces of water. Has blurry peripheral vision associated with migraines. Denies taking prescription migraine medication prior to pregnancy.     Has had increased nausea and occasional vomiting in the morning. Still taking Unisom, not taking Vitamin B6.    Has also noticed an increase in depression symptoms including: disinterest in activities, darker thoughts,lack of motivation, lack of emotional connection to fetus and feeling that the child is not hers. Has passive thoughts of self harm. Denies active plan or desire to follow through with harming herself. Wellbutrin did help her previously. She stopped taking when she had decreased symptoms in  with increase in exercise, but due to fatigue in pregnancy has not be able to workout as much.   - Did see counselor one time, but felt it was unhelpful. She does not want to see a counselor again.   - She does have people in her life that if she was feeling that she was going to cause harm to herself or someone else she could tell and get help    Is having some suprapubic pain with onset of urination. Is having vaginal swelling and discomfort with intercourse. Denies burning, hematuria, small amount of voiding.   Questions answered.    O: /83   Wt 135 lb 3.2 oz   LMP 2024 Comment: irregular with iud  BMI 24.73 kg/m²   Patients' weight gain, fluid intake and exercise level discussed.  Vitals, fundal height , fetal position, and FHR reviewed on flowsheet    Lab:No results found for this or any previous visit (from the past 2  weeks).  Ultrasounds:  - Dating US done 25:SIUP. CRL 4.20 cm, EGA 11w0d with LORETTA 25. Probably 1.79 cm corpus luteum cyst on left ovary.   - 20 wk US: Scheduled 2025  TDaP: To be done at 27wks -36 wks  Flu: Declines, education provided  GBS: To be done at 36 wks.  Rh: positive (O positive)  1 hr GTT: To be done with midtrimester labs  Genetic testing: Desires Panorama but is waiting for insurance to cover.  STI testing: negative    A/P:  24 y.o.  at 20w2d presents for routine obstetric follow-up.  Size equals dates and/or scan  - Ordered urine culture to ensure she does not have UTI. Discussed OTC vaginal moisturizers and lubricants.   - Discussed patient's headaches with Dr. Soto suggestion of starting 400 mg of Magnesium Oxide nightly for prevention and if no improvement send referral to neurology. This was discussed with patient and she demonstrated verbal understanding and willingness of compliance.   - Also discussed depression symptoms with Dr. Soto. Restarted patient on Wellbutrin 150 mg tablet once daily in the morning. Also discussed yoga, meditation, and group activities. She does not want to see a counselor at this time. Will see her back in 2 weeks to see how she is doing.   - Encouraged her to keep appointment for anatomy scan 2025  - Continue prenatal vitamins, pills not gummies.  - Exercise at least 30 minutes daily. Drink at least 3-4L of water daily  - PTL and pre-eclampsia precautions educated.    Follow-up in 2 weeks.    MANN Downs.  [facility]'s [facility]

## 2025-03-24 NOTE — PROGRESS NOTES
Pt here for Ob follow up  Reports good FM  Pt denies VB, LOF, or UC  Chaperone offered and not indicated  Pt states headaches have gotten worse, once a week has a severe migraine that last 24 hours, mild headache on and off throughout the week. Pt states intercourse has become uncomfortable, has noticed some swelling. Pt states she has noticed depression symptoms coming back.  Phone/pharm verfied [phone]     US ordered 1/27/2025 not done

## 2025-03-24 NOTE — LETTER
March 24, 2025            Génesis Ni is currently being cared for at [facility].  This patient is 20 weeks and 2 days pregnant and may fly. Patient has no indications that would restrict her from flying. Please allow her to do so. If there are any questions please reach out at [phone].        Thank you,      MANN Downs.    Electronically Signed

## 2025-03-26 ENCOUNTER — RESULTS FOLLOW-UP (OUTPATIENT)
Dept: OBGYN | Facility: CLINIC | Age: 25
End: 2025-03-26
Payer: COMMERCIAL

## 2025-03-26 LAB
BACTERIA UR CULT: NORMAL
SIGNIFICANT IND 70042: NORMAL
SITE SITE: NORMAL
SOURCE SOURCE: NORMAL

## 2025-04-18 ENCOUNTER — HOSPITAL ENCOUNTER (OUTPATIENT)
Dept: RADIOLOGY | Facility: MEDICAL CENTER | Age: 25
End: 2025-04-18
Attending: OBSTETRICS & GYNECOLOGY
Payer: COMMERCIAL

## 2025-04-18 DIAGNOSIS — Z34.01 ENCOUNTER FOR SUPERVISION OF NORMAL FIRST PREGNANCY IN FIRST TRIMESTER: ICD-10-CM

## 2025-04-18 PROCEDURE — 76805 OB US >/= 14 WKS SNGL FETUS: CPT

## 2025-04-28 ENCOUNTER — TELEPHONE (OUTPATIENT)
Dept: OBGYN | Facility: CLINIC | Age: 25
End: 2025-04-28
Payer: COMMERCIAL

## 2025-04-28 NOTE — Clinical Note
REFERRAL APPROVAL NOTICE         Sent on April 28, 2025                   Génesis Ni  [address] [place] [address]                   Dear Ms. Ni,    After a careful review of the medical information and benefit coverage, [facility] has processed your referral. See below for additional details.    If applicable, you must be actively enrolled with your insurance for coverage of the authorized service. If you have any questions regarding your coverage, please contact your insurance directly.    REFERRAL INFORMATION   Referral #:  [id number]  Referred-To Department    Referred-By Provider:  Maternal Fetal Medicine    Igor Willoughby M.D.   Maternal Fetal Med      [address]  [place] [address]  [phone] [address], [address]  [place] [address]  [phone]    Referral Start Date:  04/28/2025  Referral End Date:   04/28/2026             SCHEDULING  If you do not already have an appointment, please call [phone] to make an appointment.     MORE INFORMATION  If you do not already have a [employer] account, sign up at: [email or web address]  You can access your medical information, make appointments, see lab results, billing information, and more.  If you have questions regarding this referral, please contact  the [facility] Referrals department at:             [phone]. Monday - Friday 8:00AM - 5:00PM.     Sincerely,    [facility]

## 2025-04-28 NOTE — TELEPHONE ENCOUNTER
Called pt and LVM letting pt know per Dr. Willoughby that he placed a referral for perinatology and to expect call from them within a week to schedule an appt.

## 2025-05-06 ENCOUNTER — HOSPITAL ENCOUNTER (OUTPATIENT)
Dept: LAB | Facility: MEDICAL CENTER | Age: 25
End: 2025-05-06
Attending: STUDENT IN AN ORGANIZED HEALTH CARE EDUCATION/TRAINING PROGRAM
Payer: COMMERCIAL

## 2025-05-06 LAB
ALBUMIN SERPL BCP-MCNC: 3.7 G/DL (ref 3.2–4.9)
ALBUMIN/GLOB SERPL: 1.3 G/DL
ALP SERPL-CCNC: 79 U/L (ref 30–99)
ALT SERPL-CCNC: 26 U/L (ref 2–50)
ANION GAP SERPL CALC-SCNC: 10 MMOL/L (ref 7–16)
AST SERPL-CCNC: 22 U/L (ref 12–45)
BILIRUB SERPL-MCNC: 0.3 MG/DL (ref 0.1–1.5)
BUN SERPL-MCNC: 6 MG/DL (ref 8–22)
CALCIUM ALBUM COR SERPL-MCNC: 9 MG/DL (ref 8.5–10.5)
CALCIUM SERPL-MCNC: 8.8 MG/DL (ref 8.5–10.5)
CHLORIDE SERPL-SCNC: 104 MMOL/L (ref 96–112)
CO2 SERPL-SCNC: 21 MMOL/L (ref 20–33)
CREAT SERPL-MCNC: 0.51 MG/DL (ref 0.5–1.4)
CREAT UR-MCNC: 61.6 MG/DL
GFR SERPLBLD CREATININE-BSD FMLA CKD-EPI: 131 ML/MIN/1.73 M 2
GLOBULIN SER CALC-MCNC: 2.8 G/DL (ref 1.9–3.5)
GLUCOSE SERPL-MCNC: 82 MG/DL (ref 65–99)
POTASSIUM SERPL-SCNC: 4.1 MMOL/L (ref 3.6–5.5)
PROT SERPL-MCNC: 6.5 G/DL (ref 6–8.2)
PROT UR-MCNC: 10.9 MG/DL (ref 0–15)
PROT/CREAT UR: 177 MG/G (ref 10–107)
SODIUM SERPL-SCNC: 135 MMOL/L (ref 135–145)

## 2025-05-06 PROCEDURE — 80053 COMPREHEN METABOLIC PANEL: CPT

## 2025-05-06 PROCEDURE — 84156 ASSAY OF PROTEIN URINE: CPT

## 2025-05-06 PROCEDURE — 36415 COLL VENOUS BLD VENIPUNCTURE: CPT

## 2025-05-06 PROCEDURE — 82570 ASSAY OF URINE CREATININE: CPT

## 2025-07-22 ENCOUNTER — APPOINTMENT (OUTPATIENT)
Dept: ADMISSIONS | Facility: MEDICAL CENTER | Age: 25
End: 2025-07-22
Attending: OBSTETRICS & GYNECOLOGY
Payer: COMMERCIAL

## 2025-07-23 ENCOUNTER — PRE-ADMISSION TESTING (OUTPATIENT)
Dept: ADMISSIONS | Facility: MEDICAL CENTER | Age: 25
End: 2025-07-23
Attending: OBSTETRICS & GYNECOLOGY
Payer: COMMERCIAL

## 2025-07-28 ENCOUNTER — HOSPITAL ENCOUNTER (INPATIENT)
Facility: MEDICAL CENTER | Age: 25
LOS: 2 days | End: 2025-07-30
Attending: OBSTETRICS & GYNECOLOGY | Admitting: OBSTETRICS & GYNECOLOGY
Payer: COMMERCIAL

## 2025-07-28 ENCOUNTER — ANESTHESIA EVENT (OUTPATIENT)
Dept: OBGYN | Facility: MEDICAL CENTER | Age: 25
End: 2025-07-28
Payer: COMMERCIAL

## 2025-07-28 ENCOUNTER — ANESTHESIA (OUTPATIENT)
Dept: OBGYN | Facility: MEDICAL CENTER | Age: 25
End: 2025-07-28
Payer: COMMERCIAL

## 2025-07-28 PROCEDURE — 700105 HCHG RX REV CODE 258: Performed by: ANESTHESIOLOGY

## 2025-07-28 PROCEDURE — 700111 HCHG RX REV CODE 636 W/ 250 OVERRIDE (IP): Performed by: OBSTETRICS & GYNECOLOGY

## 2025-07-28 PROCEDURE — 700111 HCHG RX REV CODE 636 W/ 250 OVERRIDE (IP): Mod: JZ | Performed by: ANESTHESIOLOGY

## 2025-07-28 RX ORDER — SODIUM CHLORIDE, SODIUM GLUCONATE, SODIUM ACETATE, POTASSIUM CHLORIDE AND MAGNESIUM CHLORIDE 526; 502; 368; 37; 30 MG/100ML; MG/100ML; MG/100ML; MG/100ML; MG/100ML
INJECTION, SOLUTION INTRAVENOUS
Status: DISCONTINUED | OUTPATIENT
Start: 2025-07-28 | End: 2025-07-28 | Stop reason: SURG

## 2025-07-28 RX ORDER — DEXAMETHASONE SODIUM PHOSPHATE 4 MG/ML
INJECTION, SOLUTION INTRA-ARTICULAR; INTRALESIONAL; INTRAMUSCULAR; INTRAVENOUS; SOFT TISSUE PRN
Status: DISCONTINUED | OUTPATIENT
Start: 2025-07-28 | End: 2025-07-28 | Stop reason: SURG

## 2025-07-28 RX ORDER — ONDANSETRON 2 MG/ML
INJECTION INTRAMUSCULAR; INTRAVENOUS PRN
Status: DISCONTINUED | OUTPATIENT
Start: 2025-07-28 | End: 2025-07-28 | Stop reason: SURG

## 2025-07-28 RX ORDER — OXYTOCIN 10 [USP'U]/ML
INJECTION, SOLUTION INTRAMUSCULAR; INTRAVENOUS PRN
Status: DISCONTINUED | OUTPATIENT
Start: 2025-07-28 | End: 2025-07-28 | Stop reason: SURG

## 2025-07-28 RX ORDER — KETOROLAC TROMETHAMINE 15 MG/ML
INJECTION, SOLUTION INTRAMUSCULAR; INTRAVENOUS PRN
Status: DISCONTINUED | OUTPATIENT
Start: 2025-07-28 | End: 2025-07-28 | Stop reason: SURG

## 2025-07-28 RX ORDER — BUPIVACAINE HYDROCHLORIDE 7.5 MG/ML
INJECTION, SOLUTION INTRASPINAL
Status: COMPLETED | OUTPATIENT
Start: 2025-07-28 | End: 2025-07-28

## 2025-07-28 RX ORDER — MORPHINE SULFATE 0.5 MG/ML
INJECTION, SOLUTION EPIDURAL; INTRATHECAL; INTRAVENOUS
Status: COMPLETED | OUTPATIENT
Start: 2025-07-28 | End: 2025-07-28

## 2025-07-28 RX ADMIN — PHENYLEPHRINE HYDROCHLORIDE 0.25 MCG/KG/MIN: 10 INJECTION INTRAVENOUS at 17:37

## 2025-07-28 RX ADMIN — SODIUM CHLORIDE, SODIUM GLUCONATE, SODIUM ACETATE, POTASSIUM CHLORIDE AND MAGNESIUM CHLORIDE: 526; 502; 368; 37; 30 INJECTION, SOLUTION INTRAVENOUS at 17:34

## 2025-07-28 RX ADMIN — BUPIVACAINE HYDROCHLORIDE IN DEXTROSE 1.5 ML: 7.5 INJECTION, SOLUTION SUBARACHNOID at 17:44

## 2025-07-28 RX ADMIN — MORPHINE SULFATE 100 MCG: 0.5 INJECTION, SOLUTION EPIDURAL; INTRATHECAL; INTRAVENOUS at 17:44

## 2025-07-28 RX ADMIN — CEFAZOLIN 2 G: 1 INJECTION, POWDER, FOR SOLUTION INTRAMUSCULAR; INTRAVENOUS at 17:45

## 2025-07-28 RX ADMIN — SODIUM CHLORIDE, SODIUM GLUCONATE, SODIUM ACETATE, POTASSIUM CHLORIDE AND MAGNESIUM CHLORIDE: 526; 502; 368; 37; 30 INJECTION, SOLUTION INTRAVENOUS at 18:01

## 2025-07-28 RX ADMIN — OXYTOCIN 20 UNITS: 10 INJECTION, SOLUTION INTRAMUSCULAR; INTRAVENOUS at 18:03

## 2025-07-28 RX ADMIN — DEXAMETHASONE SODIUM PHOSPHATE 8 MG: 4 INJECTION INTRA-ARTICULAR; INTRALESIONAL; INTRAMUSCULAR; INTRAVENOUS; SOFT TISSUE at 18:04

## 2025-07-28 RX ADMIN — ONDANSETRON 4 MG: 2 INJECTION INTRAMUSCULAR; INTRAVENOUS at 17:35

## 2025-07-28 RX ADMIN — KETOROLAC TROMETHAMINE 15 MG: 15 INJECTION, SOLUTION INTRAMUSCULAR; INTRAVENOUS at 18:32

## 2025-07-28 RX ADMIN — FENTANYL CITRATE 15 MCG: 50 INJECTION, SOLUTION INTRAMUSCULAR; INTRAVENOUS at 17:44

## 2025-07-28 SDOH — ECONOMIC STABILITY: TRANSPORTATION INSECURITY
IN THE PAST 12 MONTHS, HAS LACK OF RELIABLE TRANSPORTATION KEPT YOU FROM MEDICAL APPOINTMENTS, MEETINGS, WORK OR FROM GETTING THINGS NEEDED FOR DAILY LIVING?: NO

## 2025-07-28 SDOH — ECONOMIC STABILITY: TRANSPORTATION INSECURITY
IN THE PAST 12 MONTHS, HAS THE LACK OF TRANSPORTATION KEPT YOU FROM MEDICAL APPOINTMENTS OR FROM GETTING MEDICATIONS?: NO

## 2025-07-28 ASSESSMENT — LIFESTYLE VARIABLES
HAVE PEOPLE ANNOYED YOU BY CRITICIZING YOUR DRINKING: NO
TOTAL SCORE: 0
HOW MANY TIMES IN THE PAST YEAR HAVE YOU HAD 5 OR MORE DRINKS IN A DAY: 0
EVER FELT BAD OR GUILTY ABOUT YOUR DRINKING: NO
ON A TYPICAL DAY WHEN YOU DRINK ALCOHOL HOW MANY DRINKS DO YOU HAVE: 0
CONSUMPTION TOTAL: NEGATIVE
DOES PATIENT WANT TO STOP DRINKING: NO
TOTAL SCORE: 0
HAVE YOU EVER FELT YOU SHOULD CUT DOWN ON YOUR DRINKING: NO
ALCOHOL_USE: NO
TOTAL SCORE: 0
EVER HAD A DRINK FIRST THING IN THE MORNING TO STEADY YOUR NERVES TO GET RID OF A HANGOVER: NO
AVERAGE NUMBER OF DAYS PER WEEK YOU HAVE A DRINK CONTAINING ALCOHOL: 0

## 2025-07-28 ASSESSMENT — SOCIAL DETERMINANTS OF HEALTH (SDOH)
WITHIN THE LAST YEAR, HAVE YOU BEEN AFRAID OF YOUR PARTNER OR EX-PARTNER?: NO
WITHIN THE LAST YEAR, HAVE YOU BEEN HUMILIATED OR EMOTIONALLY ABUSED IN OTHER WAYS BY YOUR PARTNER OR EX-PARTNER?: NO
WITHIN THE LAST YEAR, HAVE TO BEEN RAPED OR FORCED TO HAVE ANY KIND OF SEXUAL ACTIVITY BY YOUR PARTNER OR EX-PARTNER?: NO
IN THE PAST 12 MONTHS, HAS THE ELECTRIC, GAS, OIL, OR WATER COMPANY THREATENED TO SHUT OFF SERVICE IN YOUR HOME?: NO
WITHIN THE LAST YEAR, HAVE YOU BEEN KICKED, HIT, SLAPPED, OR OTHERWISE PHYSICALLY HURT BY YOUR PARTNER OR EX-PARTNER?: NO
WITHIN THE PAST 12 MONTHS, YOU WORRIED THAT YOUR FOOD WOULD RUN OUT BEFORE YOU GOT THE MONEY TO BUY MORE: NEVER TRUE
WITHIN THE PAST 12 MONTHS, THE FOOD YOU BOUGHT JUST DIDN'T LAST AND YOU DIDN'T HAVE MONEY TO GET MORE: NEVER TRUE

## 2025-07-28 ASSESSMENT — FIBROSIS 4 INDEX: FIB4 SCORE: 0.45

## 2025-07-28 ASSESSMENT — PATIENT HEALTH QUESTIONNAIRE - PHQ9
2. FEELING DOWN, DEPRESSED, IRRITABLE, OR HOPELESS: NOT AT ALL
1. LITTLE INTEREST OR PLEASURE IN DOING THINGS: NOT AT ALL
SUM OF ALL RESPONSES TO PHQ9 QUESTIONS 1 AND 2: 0

## 2025-07-28 ASSESSMENT — EDINBURGH POSTNATAL DEPRESSION SCALE (EPDS)
I HAVE FELT SCARED OR PANICKY FOR NO GOOD REASON: NO, NOT AT ALL
I HAVE BEEN SO UNHAPPY THAT I HAVE HAD DIFFICULTY SLEEPING: NOT AT ALL
I HAVE LOOKED FORWARD WITH ENJOYMENT TO THINGS: AS MUCH AS I EVER DID
I HAVE FELT SAD OR MISERABLE: NOT VERY OFTEN
I HAVE BEEN ABLE TO LAUGH AND SEE THE FUNNY SIDE OF THINGS: AS MUCH AS I ALWAYS COULD
THINGS HAVE BEEN GETTING ON TOP OF ME: NO, MOST OF THE TIME I HAVE COPED QUITE WELL
I HAVE BEEN ANXIOUS OR WORRIED FOR NO GOOD REASON: HARDLY EVER
THE THOUGHT OF HARMING MYSELF HAS OCCURRED TO ME: NEVER
I HAVE BEEN SO UNHAPPY THAT I HAVE BEEN CRYING: ONLY OCCASIONALLY
I HAVE BLAMED MYSELF UNNECESSARILY WHEN THINGS WENT WRONG: NOT VERY OFTEN

## 2025-07-28 ASSESSMENT — PAIN DESCRIPTION - PAIN TYPE
TYPE: ACUTE PAIN
TYPE: SURGICAL PAIN

## 2025-07-28 ASSESSMENT — PAIN SCALES - GENERAL
PAINLEVEL: 0 - NO PAIN
PAIN_LEVEL: 0

## 2025-07-28 NOTE — ANESTHESIA PREPROCEDURE EVALUATION
Case: [id number] Date/Time: 25 1615    Procedure: PRIMARY  SECTION    Pre-op diagnosis:       INTRAUTERINE GROWTH RESTRICTION, BREECH FETAL PRESENTATION, BICORNUATE UTERUS      38W2D IUGR    Location: LND OR 01 / SURGERY LABOR AND DELIVERY    Surgeons: Azucena Robles M.D.          26 yo  galan IUP at 38-2 here for primary  for breech presentation. Childhood asthma.     Plt 274  NPO  Relevant Problems   No relevant active problems       Physical Exam    Airway   Mallampati: II  TM distance: >3 FB  Neck ROM: full       Cardiovascular - normal exam  Rhythm: regular  Rate: normal    (-) murmur     Dental - normal exam           Pulmonary - normal examBreath sounds clear to auscultation     Abdominal    Neurological - normal exam                   Anesthesia Plan    ASA 2       Plan - spinal   Neuraxial block will be primary anesthetic                Postoperative Plan: Postoperative administration of opioids is intended.    Pertinent diagnostic labs and testing reviewed    Informed Consent:    Anesthetic plan and risks discussed with patient and spouse.    Use of blood products discussed with: spouse and patient whom consented to blood products.

## 2025-07-28 NOTE — PROGRESS NOTES
25 y.o  EDC 25 (38w2d)    Pt arrives to Labor and Delivery for scheduled primary C/S for breech presentation. Pt states +FM, denies LOF/VB/Ctx. Monitors applied x2. VSS. Pt now in pre-op. POC discussed. All questions and concerns addressed at this time.    1605 Case delayed d/t emergent case on the floor. [facility] TOUSSAINT updated.    1734 Pt into OR1.     1802 Delivery of viable female. APGARs 8/9.    1847 Pt into PACU1 in stable condition.     Pt transferred to PP in stable condition. Report given to Sabine WOLFE.

## 2025-07-29 ASSESSMENT — PAIN DESCRIPTION - PAIN TYPE
TYPE: SURGICAL PAIN

## 2025-07-29 NOTE — DISCHARGE PLANNING
Discharge Planning Assessment Post Partum    Reason for Referral: History of depression   Address: [address] [place] [address] [place] [address]  Phone: [phone]  Type of Living Situation: stable housing   Mom Diagnosis: Pregnancy,    Baby Diagnosis: [place]-38.2 weeks   Primary Language: English     Name of Baby: Felipe Waller (: 25)  Father of the Baby: Franklyn Waller  Involved in baby’s care? Yes  Contact Information: [phone]    [facility] Care: [facility]   Mom's PCP: No PCP listed   PCP for new baby: Dr. Leon     Support System: FOB  Coping/Bonding between mother & baby: Yes  Source of Feeding: breast feeding   Supplies for Infant: prepared for infant    Mom's Insurance: [employer]   Baby Covered on Insurance: Yes  Mother Employed/School:    Other children in the home/names & ages: first baby     Financial Hardship/Income: No   Mom's Mental status: alert and oriented   Services used prior to admit: None     CPS History: No  Psychiatric History: History of depression.  MOB scored a 5 on the EPDS screen.  Discussed with parents and provided resources  Domestic Violence History: No  Drug/ETOH History: No    Resources Provided: PPD/PPA handout   Referrals Made: None      Clearance for Discharge: Infant is cleared to discharge home with parents once medically cleared

## 2025-07-29 NOTE — PROGRESS NOTES
Shift Goals  Clinical Goals: vitals stable, fundus firm, ambulate, voiding without difficulty  Patient Goals: pain control, rest  Family Goals: support    Progress made toward(s) clinical / shift goals:  Génesis is post  with her first baby, her vitals are stable, her fundus is firm, she is ambulating without difficulty, her oliveira remain in place, her pain is managed with ordered pain medications, her So is at bedside, they are participating in cares as able, both appear to be bonding well with baby

## 2025-07-29 NOTE — CARE PLAN
The patient is Stable - Low risk of patient condition declining or worsening    Shift Goals  Clinical Goals: lochia wdl  Patient Goals:   Family Goals: support    Progress made toward(s) clinical / shift goals:  FF@u with light lochia    Patient is not progressing towards the following goals:

## 2025-07-29 NOTE — PROGRESS NOTES
Progress Note    Génesis Ruthjaen Ni   Post-op day 1  Chief Admitting Dx: Indication for care in labor or delivery [O75.9]  Delivery Type:  for breech      Subjective:  Ambulating: yes  Tolerating oral feed: yes  Flatus: yes    Voiding: yes  Dizziness: no  Vitals:    25 0300 25 0412 25 0500 25 0545   BP:    99/52   Pulse: (!) 57 (!) 54 (!) 57 (!) 52   Resp: 16 16 16 16   Temp:    36.4 °C (97.6 °F)   TempSrc:    Temporal   SpO2: 96% 95% 97% 97%   Weight:       Height:           Exam:  Abdomen: Abdomen soft, non-tender. BS normal. No masses,  No organomegaly  Incision: dry and intact  Fundus Tenderness: no  Below umbilicus: yes  Perineum: perineum intact  Extremities: Normal  Lochia: mild    Labs:   Recent Results (from the past 24 hours)   Hold Blood Bank Specimen (Not Tested)    Collection Time: 25  3:02 PM   Result Value Ref Range    Holding Tube - Bb DONE    CBC with Differential    Collection Time: 25  3:02 PM   Result Value Ref Range    WBC 14.5 (H) 4.8 - 10.8 K/uL    RBC 4.44 4.20 - 5.40 M/uL    Hemoglobin 12.2 12.0 - 16.0 g/dL    Hematocrit 37.8 37.0 - 47.0 %    MCV 85.1 81.4 - 97.8 fL    MCH 27.5 27.0 - 33.0 pg    MCHC 32.3 32.2 - 35.5 g/dL    RDW 42.6 35.9 - 50.0 fL    Platelet Count 274 164 - 446 K/uL    MPV 11.0 9.0 - 12.9 fL    Neutrophils-Polys 72.80 (H) 44.00 - 72.00 %    Lymphocytes 17.10 (L) 22.00 - 41.00 %    Monocytes 8.80 0.00 - 13.40 %    Eosinophils 0.60 0.00 - 6.90 %    Basophils 0.30 0.00 - 1.80 %    Immature Granulocytes 0.40 0.00 - 0.90 %    Nucleated RBC 0.00 0.00 - 0.20 /100 WBC    Neutrophils (Absolute) 10.56 (H) 1.82 - 7.42 K/uL    Lymphs (Absolute) 2.48 1.00 - 4.80 K/uL    Monos (Absolute) 1.28 (H) 0.00 - 0.85 K/uL    Eos (Absolute) 0.08 0.00 - 0.51 K/uL    Baso (Absolute) 0.04 0.00 - 0.12 K/uL    Immature Granulocytes (abs) 0.06 0.00 - 0.11 K/uL    NRBC (Absolute) 0.00 K/uL   T.PALLIDUM AB LEFTY (Syphilis)    Collection Time: 25  3:02  PM   Result Value Ref Range    Syphilis, Treponemal Qual Non-Reactive Non-Reactive       Assessment:  Continue Routine postpartum care      Plan:  Advance Diet and Encourange Ambulation    Dayna Cardona M.D.

## 2025-07-29 NOTE — PROGRESS NOTES
Assessment done. Vital signs stable.  Fundus firm at umbilicus with light lochia. Dressing over low abdominal incision clean, dry, and intact.  Patient ambulating with steady gait. Patient claims to have good pain relief with p.o meds. Breast feeding infant on demand. Family at bedside. Patient encouraged to call for any needs.

## 2025-07-29 NOTE — PROGRESS NOTES
"45-62\" abdominal binder sent to tube station 34.    Contact traction for any questions or concerns.   "

## 2025-07-29 NOTE — LACTATION NOTE
This note was copied from a baby's chart.  Follow-Up Consult:     History:   Génesis VELEZ, is a 26 y/o  s/p PC/S at 38+2 wks on 2025 at 1802. Bicornate uterus, breech, IUGR. H/o depression.     Baby girl had BW 2550 g (5 lbs, 10 oz) SGA 5th%ile.     Family lives in [place], [place]. [facility], Elie Rondon, IBCLC for lactation support.      History of BF: Primip.     Report of Current Breastfeeding Status:   POB report baby has been clusterfeeding, every 1-2h since delivery, lasting 15-30 mins. Latch 8-9. MOB demonstrates excellent latch mechanics.     Baby latched at left breast in cradle feeding with a nutritive pattern at time of visit. Nipple round on release.     Breasts round/soft/symmetrical, nipples everted/intact/pliable/mildly sore. MOB reports soreness is improving and she feels she had a couple of shallow latches early on, but baby is now latching more deeply.     MOB able to hand express colostrum independently.     Baby girl with good color and tone, voiding/stooling appropriate to DOL. Milk drunk after feeding.     Breastfeeding Assistance:     Reviewed techniques for deepening latch: place baby tummy to tummy and nipple to nose, wedge breast, hand express to tease baby to a wide gape and achieve deep latch.      Provided breastfeeding education on: skin to skin, supply and demand, hunger cues, frequency/duration of breastfeeds, cluster feeding, shallow vs deep latch, and nutritive vs non-nutritive suck.     [facility] Breastfeeding Resources handout provided and outpatient lactation care and support [place] options reviewed.     Encouraged to watch latch and sore nipples videos on [employer].      PLAN:    Skin to skin when either parent awake and alert.    Offer breast whenever hunger cues noted.    If baby sleeps more than 3 hours, wake baby and offer breast.    If baby not waking for feeding at least every 3 hours, hand express and spoon/cup feed  back EBM to baby.    Watch latch and sore nipples videos on [employer].

## 2025-07-29 NOTE — ANESTHESIA POSTPROCEDURE EVALUATION
Patient: Génesis Ni    Procedure Summary       Date: 25 Room / Location: [facility] OR 01 / SURGERY LABOR AND DELIVERY    Anesthesia Start:  Anesthesia Stop:     Procedure: PRIMARY  SECTION Diagnosis:       (INTRAUTERINE GROWTH RESTRICTION, BREECH FETAL PRESENTATION, BICORNUATE UTERUS)      (38W2D IUGR)    Surgeons: Azucena Robles M.D. Responsible Provider: Fior Knapp M.D.    Anesthesia Type: spinal ASA Status: 2            Final Anesthesia Type: spinal  Last vitals  BP   105/65   Temp   98.0   Pulse   65   Resp   18    SpO2   93%     Anesthesia Post Evaluation    Patient location during evaluation: PACU  Patient participation: complete - patient participated  Level of consciousness: awake and alert  Pain score: 0    Airway patency: patent  Anesthetic complications: no  Cardiovascular status: hemodynamically stable  Respiratory status: acceptable  Hydration status: euvolemic    PONV: none    patient able to participate, but full recovery from regional anesthesia has not occurred and is not expected within the stipulated timeframe for the completion of the evaluation      No notable events documented.

## 2025-07-29 NOTE — ANESTHESIA PROCEDURE NOTES
Spinal Block    Date/Time: 7/28/2025 5:39 PM    Performed by: Fior Knapp M.D.  Authorized by: Fior Knapp M.D.    Patient Location:  OB  Start Time:  7/28/2025 5:39 PM  End Time:  7/28/2025 5:44 PM  Reason for Block: primary anesthetic    patient identified, IV checked, site marked, risks and benefits discussed, surgical consent, monitors and equipment checked, pre-op evaluation and timeout performed    Patient Position:  Sitting  Prep: ChloraPrep, patient draped and sterile technique    Monitoring:  Blood pressure, continuous pulse oximetry and heart rate  Approach:  Midline  Location:  L4-5  Injection Technique:  Single-shot  Skin infiltration:  Lidocaine  Strength:  1%  Dose:  2ml  Needle Type:  Tom  Needle Gauge:  25 G  CSF flowing pre/post injection:  Yes  Sensory Level:  T4   2 passes. No heme. Pt tolerated procedure well.

## 2025-07-29 NOTE — ANESTHESIA TIME REPORT
Anesthesia Start and Stop Event Times       Date Time Event    7/28/2025 1728 Ready for Procedure     1734 Anesthesia Start     1848 Anesthesia Stop          Responsible Staff  07/28/25      Name Role Begin End    Fior Knapp M.D. Anesth 1734 1848          Overtime Reason:  no overtime (within assigned shift)    Comments:

## 2025-07-29 NOTE — H&P
"Labor and Delivery History and Physical    CC: Presents for scheduled  section    HPI: 24yo G1 at 38w2d, EDC 25, presents to L&D for scheduled primary  due to breech fetal presentation and IUGR. She is doing well today w/out complaints. No VB, CTX, or LOF. Her baby is moving well. There was also an amniotic band/shelf present in the amniotic cavity visualized on u/s but there has not been a concern for any fetal anomaly.     All other systems were reviewed and were negative except as stated above.    PMH: hx of depression, vitamin D deficiency    PSH: none    OB HX: first pregnancy    Gyn Hx: no hx STIs, no abnl paps    SH: no T/E/D,     FH: diabetes    Meds: PNV, Vitamin D3    Allergies: NKDA    /69   Pulse 68   Temp 36.2 °C (97.1 °F) (Temporal)   Resp 18   Ht 1.575 m (5' 2\")   Wt 68.5 kg (151 lb 0.2 oz)   LMP 2024 Comment: irregular with iud  SpO2 97%   BMI 27.62 kg/m²     Physical Exam  Gen: NAD  Resp: normal effort, no distress  Abd: soft, gravid, non tender, no rebound or guarding  Ext: No S/S of DVT, nontender    FHT: 130, category 1  [place]: none  SVE: deferred    Laboratory Evaluation  ABO: O+  GBS: neg   GTT: WNL  Rubella: Immune  HIV: Neg  RPR: NR  HepBsAg: neg  Hep C: neg    Recent Labs     25  1502   WBC 14.5*   RBC 4.44   HEMOGLOBIN 12.2   HEMATOCRIT 37.8   MCV 85.1   MCH 27.5   RDW 42.6   PLATELETCT 274   MPV 11.0   NEUTSPOLYS 72.80*   LYMPHOCYTES 17.10*   MONOCYTES 8.80   EOSINOPHILS 0.60   BASOPHILS 0.30         Assessment:   24yo G1 at 38w2d  Breech fetal presentation  IUGR  Maternal mullerian anomaly - possible bicornuate uterus  Amniotic band/shelf  GBS neg    Plan:  Admit to L&D  NPO  Fetal monitoring and toco  IV fluids  Anesthesia to see  Consent and prep for  section      Discussed with the patient the risks of  delivery. The risks include pain, infection, bleeding, scarring, damage to other organs in the area of operation, " anesthesia complications, and death. Specifically organs that can be damaged are bowel, bladder, ureters. I also discussed with the patient the risk of wound infection and wound breakdown. We discussed that these risks are greater in people that have diabetes or obesity. I also discussed the risk of emergency blood transfusion during procedure as well as emergency hysterectomy during procedure. Patient had the opportunity to ask questions regarding procedures. All questions answered to the patient's satisfaction. Pt agrees to proceed with surgery.      Azucena Robles M.D.

## 2025-07-29 NOTE — OP REPORT
DATE OF SERVICE:  2025     PREOPERATIVE DIAGNOSES:  24yo G1 at 38w2d  Breech fetal presentation  IUGR  Maternal mullerian anomaly - possible bicornuate uterus  Amniotic band/shelf  GBS neg     POSTOPERATIVE DIAGNOSES:  Same  Bicornuate uterus    PROCEDURE PERFORMED:  Primary low transverse  section.     SURGEON:  Azucena Robles MD     ASSISTANT: Richard Gonzales MD     ANESTHESIA:  Spinal.     ANESTHESIOLOGIST:  Fior Knapp MD     SPECIMEN:  none     ESTIMATED BLOOD LOSS:  800 mL    UOP: 100 mL    FINDINGS:  A viable female infant, Apgars of 8 and 9 in erika breech presentation with clear amniotic fluid. The uterus was bicornuate and there was a septum in the endometrial cavity which was approx 1/3 the length of the endometrium.  There were normal tubes and ovaries bilaterally.     COMPLICATIONS:  None.     PROCEDURE:  After appropriate consents were obtained, the patient was taken to the operating room where spinal anesthesia was applied without complications.  The patient was then prepped and draped in the usual sterile manner.  Clamp test on the skin verified adequate anesthesia.  A Pfannenstiel incision was made with a scalpel 3cm superior to the pubic symphysis and extended down to the rectus fascia.  The rectus fascia was incised with the scalpel and tented up. The underlying rectus muscle was  from the fascia first inferiorly then superiorly using the mullins scissors.  The rectus muscle was  bluntly in the midline.  The peritoneum was entered bluntly in the midline. The peritoneum incision was extended superiorly and inferiorly with the Metzenbaum scissors with great care to avoid injury to underlying bowel or bladder.  The vesicouterine peritoneum was tented up and entered with Metzenbaum scissors,    and a bladder flap was created.  An Dagoberto retractor was placed.  An incision was made into the lower uterine segment transversely and incision was extended bluntly.   Amniotomy was performed and there was noted to be clear amniotic fluid. The infant was in erika breech presentation.  The buttocks was delivered followed by the trunk of the body.  Once the scapula was seen, the arms were swept forward into the hysterotomy incision and then the head was delivered without any complications.  The mouth and nares were suctioned. The cord was doubly clamped and cut and the infant was handed off to awaiting neonatology team.  The placenta was then allowed to spontaneously deliver. The uterus was cleared of clots and debris.  The hysterotomy incision was reapproximated with 1-0 Vicryl suture in a running locked fashion. A second of the same suture was placed to provide additional hemostasis in a bleeding vessel on the left lateral hysterotomy in a figure of eight.  Hemostasis was noted.  The tubes and ovaries were examined and noted to be normal.  The pericolic gutters were examined and any blood clots were removed. The pelvis was irrigated with saline. Hemostasis was verified.  The Dagoberto retractor was removed.  The peritoneum was reapproximated with 3-0 Vicryl suture running.  The rectus muscles were examined and hemostatic.  The fascia was reapproximated with 0 Vicryl suture running.  The subcutaneous fat was irrigated and any small bleeders were bovied for hemostasis.  The subcutaneous fat was then reapproximated with 3-0 Vicryl suture running.  The skin was reapproximated with 4-0 Monocryl suture running. Steri strips and a dressing were placed.  Sponge, needle, instrument, and lap counts were correct x2.  Patient tolerated the procedure well and went to recovery room in stable condition.            ____________________________________     Azucena Robles MD

## 2025-07-30 ENCOUNTER — PHARMACY VISIT (OUTPATIENT)
Dept: PHARMACY | Facility: MEDICAL CENTER | Age: 25
End: 2025-07-30
Payer: COMMERCIAL

## 2025-07-30 PROCEDURE — RXMED WILLOW AMBULATORY MEDICATION CHARGE: Performed by: OBSTETRICS & GYNECOLOGY

## 2025-07-30 ASSESSMENT — PAIN DESCRIPTION - PAIN TYPE
TYPE: SURGICAL PAIN

## 2025-07-30 NOTE — DISCHARGE INSTRUCTIONS
Breastfeeding Plan:       Breastfeed baby on demand based on early feeding cues at least 8x in 24hrs. It is not recommended to let baby sleep longer than 4 hours between feedings and if sleepy, place skin to skin to promote feeding interest and milk production.  Baby's usually feed more frequently and longer when skin to skin with mother. Continue to hand express prior to latch.       Expect cluster feeding as this is normal during early days of life and growth spurts. Baby may feed more frequently and for longer periods of time.  Its normal for a baby to want to feed up to 18x in24 hr period. This frequent feeding is activating hormones to make mature breastmilk.  Breastmilk should increase in volume by postpartum day 4.  Signs and symptoms of engorgement might occur around that time.     It is not recommended to use pacifiers or supplementing with formula until breast feeding and milk production is well established or at least 4 weeks.    Make sure infant is getting enough by ensuring frequent feedings as well as looking for transitioning stools from dark meconium to bright yellow/green seedy loose stools by day 5.     Follow up with PEDS PCP as scheduled for weight checks and to make sure feeding is progressing appropriately.    Engorgement care: frequent breastfeeding, gentle (like petting a cat) massage towards axilla, cool compresses, hand express to comfort and to soften nipple area for latch, NSAIDs (like ibuprofen) as prescribed by OB for postpartum pain relief. Contact your OB provider if you develop a hard, warm, reddened lump especially if you also have a fever, chills, aches as this is concerning for mastitis.      Create a comfortable and relaxing environment for breastfeeding, minimizing distractions and ensuring proper positioning for mom and baby. Hold baby skin to skin with mom or dad as much as possible when you are awake and alert, skin to skin promotes bonding and breastfeeding success.       Breastfeeding support is available!      [facility] holds a free Breastfeeding [place] every Thursday from 11am-12pm lead by a Lactation Consultant.  No appointment necessary.  Excludes holidays. Bring your baby!  Location: [facility] in [facility]  3rd floor conference room. PATIENT DISCHARGE EDUCATION INSTRUCTION SHEET    REASONS TO CALL YOUR OBSTETRICIAN  Persistent fever, shaking, chills (Temperature higher than 100.4) may indicate you have an infection  Heavy bleeding: soaking more than 1 pad per hour; Passing clots an egg-sized clot or bigger may mean you have an postpartum hemorrhage  Foul odor from vagina or bad smelling or discolored discharge or blood  Breast infection (Mastitis symptoms); breast pain, chills, fever, redness or red streaks, may feel flu like symptoms  Urinary pain, burning or frequency  Incision that is not healing, increased redness, swelling, tenderness or pain, or any pus from episiotomy or  site may mean you have an infection  Redness, swelling, warmth, or painful to touch in the calf area of your leg may mean you have a blood clot  Severe or intensified depression, thoughts or feelings of wanting to hurt yourself or someone else   Pain in chest, obstructed breathing or shortness of breath (trouble catching your breath) may mean you are having a postpartum complication. Call your provider immediately   Headache that does not get better, even after taking medicine, a bad headache with vision changes or pain in the upper right area of your belly may mean you have high blood pressure or post birth preeclampsia. Call your provider immediately    HAND WASHING  All family and friends should wash their hands:  Before and after holding the baby  Before feeding the baby  After using the restroom or changing the baby's diaper    WOUND CARE  Ask your physician for additional care instructions. In general:   Incision:  May shower and pat incision dry   Keep the incision  clean and dry  There should not be any opening or pus from the incision  Continue to walk at home 3 times a day   Do NOT lift anything heavier than your baby (over 10 pounds)  Encourage family to help participate in care of the  to allow rest and mom time to heal  Episiotomy/Laceration  May use faith-spray bottle, witch hazel pads and dermaplast spray for comfort  Use faith-spray bottle after urinating to cleanse perineal area  To prevent burning during urination spray faith-water bottle on labial area   Pat perineal area dry until episiotomy/laceration is healed  Continue to use faith-bottle until bleeding stops as needed  If have a 2nd degree laceration or greater, a Sitz bath can offer relief from soreness, burning, and inflammation   Sitz Bath   Sit in 6 inches of warm water and soak laceration as needed until the laceration heals    VAGINAL CARE AND BLEEDING  Nothing inside vagina for 6 weeks:   No sexual intercourse, tampons or douching  Bleeding may continue for 2-4 weeks. Amount and color may vary  Soaking 1 pad or more in an hour for several hours is considered heavy bleeding  Passing large egg sized blood clots can be concerning  If you feel like you have heavy bleeding or are having increasing amount of blood clots call your Obstetrician immediately  If you begin feeling faint upon standing, feeling sick to your stomach, have clammy skin, a really fast heartbeat, have chills, start feeling confused, dizzy, sleepy or weak, or feeling like you're going to faint call your Obstetrician immediately    HYPERTENSION   Preeclampsia or gestational hypertension are types of high blood pressure that only pregnant women can get. It is important for you to be aware of symptoms to seek early intervention and treatment. If you have any of these symptoms immediately call your Obstetrician    Vision changes or blurred vision   Severe headache or pain that is unrelieved with medication and will not go away  Persistent  "pain in upper abdomen or shoulder   Increased swelling of face, feet, or hands  Difficulty breathing or shortness of breath at rest  Urinating less than usual    URINATION AND BOWEL MOVEMENTS  Eating more fiber (bran cereal, fruits, and vegetables) and drinking plenty of fluids will help to avoid constipation  Urinary frequency and urgency after childbirth is normal  If you experience any urinary pain, burning or frequency call your provider    BIRTH CONTROL  It is possible to become pregnant at any time after delivery and while breastfeeding  Plan to discuss a method of birth control with your physician at your post delivery follow up visit    POSTPARTUM BLUES  During the first few days after birth, you may experience a sense of the \"blues\" which may include impatience, irritability or even crying. These feelings come and go quickly. However, as many as 1 in 10 women experience emotional symptoms known as postpartum depression.     POSTPARTUM DEPRESSION    May start as early as the second or third day after delivery or take several weeks or months to develop. Symptoms of \"blues\" are present, but are more intense: Crying spells; loss of appetite; feelings of hopelessness or loss of control; fear of touching the baby; over concern or no concern at all about the baby; little or no concern about your own appearance/caring for yourself; and/or inability to sleep or excessive sleeping. Contact your Obstetrician if you are experiencing any of these symptoms     PREVENTING SHAKEN BABY  If you are angry or stressed, PUT THE BABY IN THE CRIB, step away, take some deep breaths, and wait until you are calm to care for the baby. DO NOT SHAKE THE BABY. You are not alone, call a supporter for help.  [facility] 24/7 crisis call line ([phone]) or (1-292.202.6874)  You can also text them, text \"ANSWER\" (699185)  "

## 2025-07-30 NOTE — CARE PLAN
The patient is Stable - Low risk of patient condition declining or worsening    Shift Goals  Clinical Goals: fundus and lochia WNL, pain management  Patient Goals: pain management, rest  Family Goals: support    Progress made toward(s) clinical / shift goals: Fundus firm, lochia scant. Pt's pain managed with scheduled pain medications and non-pharmacological methods. Pt resting with support person at bedside.    Problem: Pain - Standard  Goal: Alleviation of pain or a reduction in pain to the patient’s comfort goal  Outcome: Progressing     Problem: Knowledge Deficit - Postpartum  Goal: Patient will verbalize and demonstrate understanding of self and infant care  Outcome: Progressing     Problem: Psychosocial - Postpartum  Goal: Patient will verbalize and demonstrate effective bonding and parenting behavior  Outcome: Progressing     Problem: Altered Physiologic Condition  Goal: Patient physiologically stable as evidenced by normal lochia, palpable uterine involution and vitals within normal limits  Outcome: Progressing     Patient is not progressing towards the following goals: NA

## 2025-07-30 NOTE — PROGRESS NOTES
Assessment done. Vital signs stable. Patient voiding without difficulty, claims to be passing flatus. Fundus firm at umbilicus with light lochia. Dressing over low abdominal incision clean, dry, and intact.Patient ambulating with steady gait. Patient claims to have good pain relief with p.o meds. Breast feeding infant on demand. Family at bedside. Patient encouraged to call for any needs.

## 2025-07-30 NOTE — DISCHARGE SUMMARY
Discharge Summary:     Date of Admission: 2025  Date of Discharge: 25      Admitting diagnosis:    1. Pregnancy @ 38w2d  2.  Breech presentation  3.  Intrauterine growth restriction      Discharge Diagnosis:   1. Status post primary low-transverse  delivery  2.  Bicornuate uterus.      Past Medical History[1]  OB History    Para Term  AB Living   1 1 1 0 0 1   SAB IAB Ectopic Molar Multiple Live Births   0 0 0 0 0 1      # Outcome Date GA Lbr Addison/2nd Weight Sex Type Anes PTL Lv   1 Term 25 38w2d  2.55 kg (5 lb 10 oz) F CS-LTranv Spinal N BINDU     Past Surgical History[2]  Allergies: Patient has no known allergies.    Hospital Course:   Pt is a 25 y.o. now  who presented for scheduled primary  delivery secondary to breech presentation and fetal growth restriction at 38 weeks 2 days.  Her surgery was uncomplicated with an EBL of 800 cc.  She was noted to have a bicornuate uterus with a septum in the endometrial cavity.  Patient gave birth to a viable female infant with Apgars of 8 and 9, weight 2550 g.  Her postoperative course was unremarkable, she remained afebrile with normal vital signs.  On postoperative day #2 the patient felt ready for discharge.  On day of discharge pt was ambulating, voiding spontaneously, tolerating PO, with adequate pain control, and desiring to go home.    Physical Exam:  Temp:  [36.6 °C (97.9 °F)-36.7 °C (98.1 °F)] 36.7 °C (98.1 °F)  Pulse:  [55-70] 70  Resp:  [16-18] 18  BP: (101-121)/(56-68) 121/68  SpO2:  [95 %-97 %] 96 %  Gen: AAO, NAD  Resp: ctab  CV: RRR  Abd: soft, NT, ND, fundus firm below umbilicus; +BS   Incision: Mepilex in place, C/D/I  Ext: NT, trace edema bilaterally    Current Facility-Administered Medications   Medication Dose    lactated ringers infusion      NS infusion      oxytocin (Pitocin) infusion (for post delivery)  125 mL/hr    oxytocin (Pitocin) injection 10 Units  10 Units    miSOPROStol (Cytotec) tablet  800 mcg  800 mcg    methylergonovine (Methergine) injection 0.2 mg  0.2 mg    lactated ringers infusion  2,000 mL    ibuprofen (Motrin) tablet 800 mg  800 mg    Followed by    [START ON 8/1/2025] ibuprofen (Motrin) tablet 800 mg  800 mg    acetaminophen (Tylenol) tablet 1,000 mg  1,000 mg    Followed by    [START ON 8/1/2025] acetaminophen (Tylenol) tablet 1,000 mg  1,000 mg    oxyCODONE immediate-release (Roxicodone) tablet 5 mg  5 mg    oxyCODONE immediate-release (Roxicodone) tablet 10 mg  10 mg    ondansetron (Zofran) syringe/vial injection 4 mg  4 mg    Or    ondansetron (Zofran ODT) dispertab 4 mg  4 mg    diphenhydrAMINE (Benadryl) tablet/capsule 25 mg  25 mg    Or    diphenhydrAMINE (Benadryl) injection 25 mg  25 mg    docusate sodium (Colace) capsule 100 mg  100 mg    magnesium hydroxide (Milk Of Magnesia) suspension 30 mL  30 mL    prenatal plus vitamin (Stuartnatal 1+1) 27-1 MG tablet 1 Tablet  1 Tablet    simethicone (Mylicon) chewable tablet 125 mg  125 mg    lactated ringers infusion         Recent Labs     07/28/25  1502 07/29/25  0557   WBC 14.5* 24.7*   RBC 4.44 3.66*   HEMOGLOBIN 12.2 10.3*   HEMATOCRIT 37.8 31.1*   MCV 85.1 85.0   MCH 27.5 28.1   MCHC 32.3 33.1   RDW 42.6 42.6   PLATELETCT 274 255   MPV 11.0 11.3         Activity/ Discharge Instructions::   Discharge to home  Pelvic Rest x 6 weeks  No heavy lifting x4 weeks  Call or come to ED for: heavy vaginal bleeding, fever >100.4, severe abdominal pain, severe headache, chest pain, shortness of breath,  N/V, abnormal vaginal discharge, incisional drainage, or other concerns.       Follow up:  1-2wks for incision check with Dr. Monsalve  Discharge Meds:      Medication List        START taking these medications        Instructions   docusate sodium 100 MG Caps  Commonly known as: Colace   Take 1 Capsule by mouth 2 times a day as needed for Constipation.  Dose: 100 mg     ibuprofen 800 MG Tabs  Commonly known as: Motrin   Take 1 Tablet by  mouth every 8 hours as needed for Moderate Pain.  Dose: 800 mg     oxyCODONE immediate-release 5 MG Tabs  Commonly known as: Roxicodone   Take 1 Tablet by mouth every four hours as needed for Severe Pain for up to 5 days.  Dose: 5 mg            CONTINUE taking these medications        Instructions   PRENATAL 1 PO   Take  by mouth.            STOP taking these medications      UNISOM PO            ASK your doctor about these medications        Instructions   buPROPion 150 MG XL tablet  Commonly known as: Wellbutrin XL   Take 1 Tablet by mouth every morning.  Dose: 150 mg                 Promise Taveras MD  [facility]           [1]   Past Medical History:  Diagnosis Date    Asthma     as a child, no inhalers    Migraine    [2]   Past Surgical History:  Procedure Laterality Date    PRIMARY C SECTION  2025    Procedure: PRIMARY  SECTION;  Surgeon: Azucena Robles M.D.;  Location: [facility];  Service: Labor and Delivery

## 2025-07-30 NOTE — PROGRESS NOTES
Assessment complete. Fundus firm, lochia scant. VSS. Tolerating diet. Voiding without difficulty. Incision dressing CDI. Pt states passing gas. Pain controlled with scheduled medications per MAR. Will offer pain medications as they become available. FOB at bedside, bonding with pt/baby. POC discussed with pt. Encouraged to call with needs. Call light in place.

## 2025-07-30 NOTE — LACTATION NOTE
This note was copied from a baby's chart.  Follow-Up Consult:     History:   MOBGénesis, is a 24 y/o  s/p PC/S at 38+2 wks on 2025 at 1802. Bicornate uterus, breech, IUGR. H/o depression.      Baby girl had BW 2550 g (5 lbs, 10 oz) SGA 5th%ile, with a loss of 5.10% off BW at 24HOL.     Family lives in [place], [place]. [facility], Elie Rondon, IBCLC for lactation support.      History of BF: Primip.     Report of Current Breastfeeding Status:  MOB logged 15 breastfeedings in the last 24h, lasting 10-40 mins. Baby has been clusterfeeding overnight. MOB reports breasts are filling today and nipples are mildly sore, not worse than yesterday.    Baby girl latched at left breast in cross-cradle at time of visit, nutritive pattern with audible swallows. Baby appears mildly jaundiced, but otherwise good color and tone. Voiding appropriate to DOL, abundant stools.     Breastfeeding Assistance:     Discussed sore nipple care: Express colostrum to nipple and spread over nipple, allow to air dry. Follow with nipple cream for dryness/crustiness.     Anticipatory guidance provided regarding engorgement care: frequent breastfeeding, gentle effleurage towards axilla, cool compresses, hand express to comfort and to soften nipple area for latch, NSAIDs as prescribed for postpartum pain relief. Discussed mastitis warning signs.     [facility] handout provided and outpatient lactation care and support [place] options reviewed yesterday. Encouraged early f/u with outpt lactation.     Encouraged to watch latch and sore nipples videos on [employer].     PLAN:    Skin to skin when either parent awake and alert.    Offer breast whenever hunger cues noted.    If baby sleeps more than 3 hours, wake baby and offer breast.    If baby not waking for feeding at least every 3 hours, hand express and spoon/cup feed back EBM to baby.    Watch latch and sore nipples videos  on [employer].